# Patient Record
Sex: FEMALE | Race: WHITE | NOT HISPANIC OR LATINO | Employment: UNEMPLOYED | URBAN - NONMETROPOLITAN AREA
[De-identification: names, ages, dates, MRNs, and addresses within clinical notes are randomized per-mention and may not be internally consistent; named-entity substitution may affect disease eponyms.]

---

## 2018-04-12 ENCOUNTER — HISTORICAL (OUTPATIENT)
Dept: ADMINISTRATIVE | Facility: HOSPITAL | Age: 53
End: 2018-04-12

## 2019-02-07 ENCOUNTER — HISTORICAL (OUTPATIENT)
Dept: ADMINISTRATIVE | Facility: HOSPITAL | Age: 54
End: 2019-02-07

## 2019-04-16 ENCOUNTER — HISTORICAL (OUTPATIENT)
Dept: ADMINISTRATIVE | Facility: HOSPITAL | Age: 54
End: 2019-04-16

## 2021-03-16 ENCOUNTER — HISTORICAL (OUTPATIENT)
Dept: ADMINISTRATIVE | Facility: HOSPITAL | Age: 56
End: 2021-03-16

## 2021-06-27 ENCOUNTER — HISTORICAL (OUTPATIENT)
Dept: ADMINISTRATIVE | Facility: HOSPITAL | Age: 56
End: 2021-06-27

## 2021-07-01 ENCOUNTER — HISTORICAL (OUTPATIENT)
Dept: ADMINISTRATIVE | Facility: HOSPITAL | Age: 56
End: 2021-07-01

## 2021-07-02 ENCOUNTER — HISTORICAL (OUTPATIENT)
Dept: ADMINISTRATIVE | Facility: HOSPITAL | Age: 56
End: 2021-07-02

## 2021-07-28 ENCOUNTER — HISTORICAL (OUTPATIENT)
Dept: ADMINISTRATIVE | Facility: HOSPITAL | Age: 56
End: 2021-07-28

## 2022-04-09 ENCOUNTER — HISTORICAL (OUTPATIENT)
Dept: ADMINISTRATIVE | Facility: HOSPITAL | Age: 57
End: 2022-04-09

## 2022-04-22 ENCOUNTER — HISTORICAL (OUTPATIENT)
Dept: ADMINISTRATIVE | Facility: HOSPITAL | Age: 57
End: 2022-04-22

## 2022-04-25 VITALS
SYSTOLIC BLOOD PRESSURE: 120 MMHG | DIASTOLIC BLOOD PRESSURE: 60 MMHG | WEIGHT: 152.75 LBS | BODY MASS INDEX: 27.07 KG/M2 | OXYGEN SATURATION: 94 % | HEIGHT: 63 IN

## 2022-05-02 NOTE — HISTORICAL [facility]
This is a historical note converted from Addie. Formatting and pictures may have been removed.  Please reference Addie for original formatting and attached multimedia. Chief Complaint  Referral for Left RTC Tear  History of Present Illness  53?yo?female?non-smoker?presents to ortho clinic for?initial visit?for?left?shoulder pain.? Patient points to ?lateral?shoulder. Patient dominate hand: right  Today:? New patient referral for abnormal MRI/ shoulder pain left  Onset:??approximately 1?year??progressively worsening  Current pain level: 8/10??without pain medication. Quality described as??sharp?.? Patient?denies?use of pain medication today.?  Medications r/t complaint: Patient reports using?RX / OTC?medications in past including:?meloxicam, OTC ibuprofen/Tylenol?RX per PCP/ prior ortho MD. ?Currently taking OTC Tylenol/ibuprofen?PRN?pain with?mild?relief.?  Modifying Factors: ?Worse with/after activity;Improved with rest;??Stiffness after immobilization??Stiffness improved with less than 30 minutes of activity; ?Increased Pain with upward/ overhead movements and with extended use  Injury:?Denies  Previous treatment: HEP?RX per ortho ortho and reports reports doing?them regularly; RX NSAIDs, PT denies; CSI x2 last injection 8/2018 with good relief only lasting 1-2 days  Associated Symptoms: No Crepitus/Grinding; ?Numbness/ tingling?left hand;??No swelling;?No skin changes;?Weakness of left UE;?Moderate decrease in ROM;??difficulty sleeping at night s/t pain  Activity:?Sedentary, full ADLs;?Pain interferes with function/daily activity (mild)  PMH:? denies CVD; T1?DM reports A1C was normal per PCP;denies RA; denies?gout;? 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  Note:?  Patient was being seen by Dr. Miramontes 2018, told she needed surgery but he did not take her insurance.  Review of Systems  Constitutional:No fever;No chills;No weight loss  CV:No swelling;No edema  GI:No urinary retention;No urinary incontinence  :No fecal incontinence  Skin:No rash;No wound  Neuro:No numbness/tingling;No weakness;No saddle anesthesia  MSK: As above  Psych:No depression;No anxiety  Heme/Lymph:No easy bruising;No easy bleeding;No lymphadenopathy  Immuno:No MRSA history  Physical Exam  Vitals & Measurements  HR:?65(Apical)? BP:?144/84?  HT:?158?cm? WT:?59.1?kg? BMI:?23.67?  MSK:LEFT?SHOULDER  General: No apparent distress;?well-nourished  Inspection:?Guarding/self-imposed immobilization of??left arm;??No skin abnormalities;??Normal?alignment;??No swelling;?No?erythema;?No?bruising;?Atrophy / deconditioning noted?moderate supraspinatus  Palpation:?Tenderness noted at supraspinatus tendon; ?Crepitus:?Negative?  ROM:?  ?????Abduction (180):?90  ?????Adduction (45):?30  ?????Flexion (180):?90  ?????Extension (45):?45  ?????Internal rotation:?sacrum  ?????External rotation (60): 30  Special Tests:  ?????Winging of Scapula: ?Negative?Bilateral  ?????AC Joint Tenderness:?Negative  ?????Apley scratch test:Not tested?  ?????Supraspinatus isometric strength test:?Positive?  ?????Painful Arc Sign:Positive  ?????Jobes empty can test:?Negative?  ?????External rotation test:Positive  ?????Neer test: ?Positive  ?????Pete Teddy test:?Positive?  Neurovascular:?Intact; 2+?distal pulse, sensation intact to light touch  Neuro/Psych: Awake, Alert, Oriented; Normal mood and affect  Lymphatic: No LAD  Skin and Soft Tissue: No bruising, No ecchymosis; No rash; Skin intact  Assessment/Plan  1.?Impingement syndrome of left shoulder?M75.42  ?1.? Discussed with patient diagnosis and treatment recommendations.? Handout given.  2.? Imaging:?Radiological studies ordered, reviewed,?and independently interpreted by me; Discussed  with patient? MRI reviewed and discussed with patient. ?  3.? Treatment plan: Conservative treatment to include? ?oral glucosamine 1500 mg/day; Topical capsaicin as needed; rest;?Hot or cold therapy; Adequate vitamin C/D intake  4.? Procedure:?Discussed CSI vs VS injections as treatment options; since conservative measures did not improve symptoms patient consented for CSI today  5.? Activity:?Activity as tolerated; HEP to included aerobic conditioning with non-painful activity and ROM/STG exercises; activity modification as necessary; eliminate overhead reaching and/or forceful push/pull and use weighted pendulum stretching for 5 minutes after?heat application daily; rubber band provided for HEP  6.? Therapy:Formal PT/OT ordered?  7.? Medication:?First line treatment with? ?daily acetaminophen 1000 mg three times daily; Medication precautions given; RX diclofenac PRN pain; medication?precautions given??  8.? RTC:?3 months?with MRI CD  9.? Additional work-up:?None  Ordered:  Lidocaine inj., 5 mL, form: Injection, Intra-Articular, Once, first dose 02/07/19 12:09:00 CST, stop date 02/07/19 12:09:00 CST  triamcinolone, 40 mg, form: Injection, Intra-Articular, Once, first dose 02/07/19 12:09:00 CST, stop date 02/07/19 12:09:00 CST  asp/inj jnt/bursa, major [id number] , 02/07/19 12:09:00 CST, [facility], Routine, 02/07/19 12:09:00 CST  Clinic Follow up, *Est. 05/07/19 3:00:00 CDT, Order for future visit, Impingement syndrome of left shoulder, [facility]  Office/Outpatient Visit Level 4 Established 268[address] PC, 25, Impingement syndrome of left shoulder, [facility], 02/07/19 12:09:00 CST  ?  Pain?R52  ?same as above  ?  Orders:  diclofenac, 75 mg = 1 tab(s), Oral, BID, PRN PRN as needed for pain, with food; Do NOT take with other NSAIDs, # 60 tab(s), 0 Refill(s), Pharmacy: [facility]   Problem List/Past Medical History  Ongoing  No chronic problems  Historical  No qualifying data  Medications  ADMELOG  SOLO INJ 100U/ML, Subcutaneous, TID  Atorvastatin 10 Mg Tablet, 10 mg= 1 tab(s), qPM  BASAGLAR INJ 100UNIT  diclofenac sodium 75 mg oral delayed release tablet, 75 mg= 1 tab(s), Oral, BID, PRN  FLUTICASONE SPR 50MCG  Kenalog-40 injectable suspension, 40 mg= 1 mL, Intra-Articular, Once  Lidocaine 1% PF 5ml inj, 5 mL, Intra-Articular, Once  LISINOPRIL TAB 2.5MG, 2.5 mg= 1 tab(s), Daily  LORATADINE TAB 10MG, 10 mg= 1 tab(s), Daily  MELOXICAM TAB 15MG, 15 mg= 1 tab(s), Daily,? ?Not taking  METOPROL TAR TAB 50MG, 50 mg= 1 tab(s), BID  Allergies  No Known Medication Allergies  Social History  Alcohol  Past, 02/07/2019  Sexual  Sexual orientation: Straight or heterosexual. Gender Identity Identifies as female., 02/07/2019  Substance Abuse  Never, 02/07/2019  Tobacco  10 or more cigarettes (1/2 pack or more)/day in last 30 days, Cigarettes, No, 20 per day. 29 year(s). Previous treatment: None. Household tobacco concerns: No., 02/07/2019  Health Maintenance  Health Maintenance  ???Pending?(in the next year)  ??? ??OverDue  ??? ? ? ?Diabetes Screening due??and every?  ??? ? ? ?Diabetes Maintenance-HgbA1c due??07/27/17??and every 1??year(s)  ??? ? ? ?Smoking Cessation (Diabetes) due??07/30/18??and every 2??year(s)  ??? ??Due?  ??? ? ? ?ADL Screening due??02/07/19??and every 1??year(s)  ??? ? ? ?Alcohol Misuse Screening due??02/07/19??and every 1??year(s)  ??? ? ? ?Breast Cancer Screening due??02/07/19??and every?  ??? ? ? ?Cervical Cancer Screening due??02/07/19??and every?  ??? ? ? ?Colorectal Screening due??02/07/19??and every?  ??? ? ? ?Diabetes Maintenance-Eye Exam due??02/07/19??and every?  ??? ? ? ?Diabetes Maintenance-Foot Exam due??02/07/19??and every?  ??? ? ? ?Diabetes Maintenance-Fasting Lipid Profile due??02/07/19??and every?  ??? ? ? ?Lipid Screening due??02/07/19??and every?  ??? ? ? ?Tetanus Vaccine due??02/07/19??and every 10??year(s)  ???Satisfied?(in the past 1 year)  ??? ??Satisfied?  ??? ? ? ?Blood Pressure  Screening on??02/07/19.??Satisfied by Nelly Churchill RN  ??? ? ? ?Body Mass Index Check on??02/07/19.??Satisfied by Nelly Churchill RN  ??? ? ? ?Depression Screening on??02/07/19.??Satisfied by Nelly Churchill RN  ??? ? ? ?Obesity Screening on??02/07/19.??Satisfied by Nelly Churchill RN  ?  ?  Diagnostic Results  02/07/2019 10:20 CST XR Shoulder Left Minimum 2 Views  Radiology Report  Left shoulder 4 views  Clinical history is pain  There are no fractures seen. There is no dislocation. There are no  bony lesions noted.  IMPRESSION: No fractures are demonstrated.  ?   MRI dated 4/4/18 per referral from [facility]:  Multiple articular side partial tears with severe tendinopathy of the distal supraspinatus tendon.? Subacromial/subdeltoid bursitis.? Supraspinatus outlet impingement syndrome. ?No definitive labral tear. ?No evidence of abnormal bone marrow signal.     [1]?XR Shoulder Left Minimum 2 Views; Ingrid KIRKLAND, Earle IRWIN 02/07/2019 10:20 CST

## 2022-05-02 NOTE — HISTORICAL [facility] CERNER
This is a historical note converted from Addie. Formatting and pictures may have been removed.  Please reference Addie for original formatting and attached multimedia. Procedure Name  Date/Time:2/7/2019 12:16:38  Procedure:??Left?SASD Injection  Indications:??Diagnostic and Therapeutic Indication - decrease pain, increase range of motion and improve quality of life  RISKS: Possible complications with injection include?bleeding, infection (0.01%), tendon rupture, steroid flare, fat pad or soft tissue atrophy, skin depigmentation, and vasovagal response. ?(Steroid flair treatment is rest, ice, NSAIDs and resolves in 24-36 hours.)  Consent:???Procedure, risks, benefits, & alternatives explained to patient, who voiced understanding and agreed to proceed with procedure. ?Consent signed and?scanned into the medical record. No absolute contraindications (cellulitis overlying joint, infection, lack of informed consent, allergy to injection mediation, kailee protein or egg allergy for sodium hyaluronate, or history of steroid flare) or relative contraindications (brittle or out of control DM HgA1c > 10, coagulopathy INR > 3.5, previous joint replacement, or history of AVN).  Description:?Time out performed. The patient was prepped?using Chlorhexidine scrub after the appropriate?identification of anatomic landmarks.? Sterile needle used (size # 21 gauge, length 1.5 inch)?Topical anesthetic of ethyl chloride was used.? ?5 mL of 1% lidocaine plain with 40 mg of Kenalog injected.  Complications:?None?  EBL:??None  Post Procedure:?Patient reports improvement in pain and ROM. Patient alert, moving all extremities. ?Good peripheral pulses, no signs of vascular compromise, range of motion intact. ?Patient tolerated procedure well. ?Aftercare instructions were given to patient at time of discharge.??Relative rest for 3 days - avoiding excessive activity. ?Pendulum stretching exercises followed by stretching exercises daily?starting  on day 4.? Place ice on area for 15 minutes every 4 to 6 hours.??Tylenol 1000mg twice a day or ibuprofen 600 mg three times per day for next 3-4 days if not on medication already. ?Protect the area for the next 1-8 hours if anesthetic was used. ?Avoid excessive activity for next 3 to 4 weeks.?ER precautions for fever, severe joint pain, or allergic reaction or other new symptoms related to joint injection.

## 2022-05-03 ENCOUNTER — HISTORICAL (OUTPATIENT)
Dept: ADMINISTRATIVE | Facility: HOSPITAL | Age: 57
End: 2022-05-03

## 2022-08-27 ENCOUNTER — HISTORICAL (OUTPATIENT)
Dept: ADMINISTRATIVE | Facility: HOSPITAL | Age: 57
End: 2022-08-27

## 2022-08-27 ENCOUNTER — HOSPITAL ENCOUNTER (INPATIENT)
Facility: HOSPITAL | Age: 57
LOS: 3 days | Discharge: HOME | DRG: 282 | End: 2022-08-30
Attending: INTERNAL MEDICINE | Admitting: INTERNAL MEDICINE
Payer: MEDICAID

## 2022-08-27 DIAGNOSIS — I21.4 NSTEMI (NON-ST ELEVATED MYOCARDIAL INFARCTION): ICD-10-CM

## 2022-08-27 DIAGNOSIS — R07.9 CHEST PAIN: ICD-10-CM

## 2022-08-27 PROBLEM — I20.0 UNSTABLE ANGINA: Status: ACTIVE | Noted: 2022-08-27

## 2022-08-27 PROCEDURE — 36415 COLL VENOUS BLD VENIPUNCTURE: CPT | Performed by: NURSE PRACTITIONER

## 2022-08-27 PROCEDURE — 93005 ELECTROCARDIOGRAM TRACING: CPT

## 2022-08-27 PROCEDURE — 84484 ASSAY OF TROPONIN QUANT: CPT | Performed by: NURSE PRACTITIONER

## 2022-08-27 PROCEDURE — 21400001 HC TELEMETRY ROOM

## 2022-08-27 PROCEDURE — 93010 ELECTROCARDIOGRAM REPORT: CPT | Mod: ,,, | Performed by: INTERNAL MEDICINE

## 2022-08-27 PROCEDURE — 93010 EKG 12-LEAD: ICD-10-PCS | Mod: ,,, | Performed by: INTERNAL MEDICINE

## 2022-08-27 PROCEDURE — 11000001 HC ACUTE MED/SURG PRIVATE ROOM

## 2022-08-27 RX ORDER — SODIUM CHLORIDE 0.9 % (FLUSH) 0.9 %
10 SYRINGE (ML) INJECTION
Status: DISCONTINUED | OUTPATIENT
Start: 2022-08-28 | End: 2022-08-30 | Stop reason: HOSPADM

## 2022-08-27 NOTE — Clinical Note
The catheter was inserted into the, was removed from the and was inserted over the wire into the ostial  left coronary artery. Hemodynamics were performed.  An angiography was performed of the left coronary arteries. Multiple views were taken. The angiography was performed via power injection.

## 2022-08-27 NOTE — Clinical Note
The catheter was inserted into the, was removed from the and was inserted over the wire into the aorta. Hemodynamics were performed.  An angiography was performed of the aorta and right coronary arteries. Multiple views were taken. The angiography was performed via power injection.  Aortic root angio performed, RCA visualized non selectively.

## 2022-08-27 NOTE — Clinical Note
The catheter was inserted into the, was removed from the and was inserted over the wire into the ostium   right coronary artery. The catheter was unable to engage the area..

## 2022-08-27 NOTE — Clinical Note
The left ventricle was selectively engaged. Catheter inserted and removed after LV pressures measured.

## 2022-08-27 NOTE — Clinical Note
The catheter was inserted into the, was removed from the and was inserted over the wire into the ostium   right coronary artery. An angiography was performed of the right coronary arteries. The catheter was unable to engage the area..

## 2022-08-27 NOTE — Clinical Note
The radial band was applied to the right radial artery. 13 cc's of air were inserted into the closure device.

## 2022-08-27 NOTE — Clinical Note
The groin and right radial was prepped. The site was prepped with ChloraPrep. The site was clipped. The patient was draped. The patient was positioned supine.

## 2022-08-27 NOTE — Clinical Note
The catheter was inserted into the, was removed from the and was inserted over the wire into the ostium   right coronary artery.

## 2022-08-28 LAB
ABS NEUT (OLG): 7.54 X10(3)/MCL (ref 2.1–9.2)
APTT PPP: 24 SECONDS (ref 23.2–33.7)
APTT PPP: 25.2 SECONDS (ref 23.2–33.7)
AV INDEX (PROSTH): 0.91
AV MEAN GRADIENT: 3 MMHG
AV PEAK GRADIENT: 6 MMHG
AV VALVE AREA: 2.58 CM2
AV VELOCITY RATIO: 0.97
BASOPHILS # BLD AUTO: 0.08 X10(3)/MCL (ref 0–0.2)
BASOPHILS NFR BLD AUTO: 0.7 %
BSA FOR ECHO PROCEDURE: 1.67 M2
CV ECHO LV RWT: 0.39 CM
DOP CALC AO PEAK VEL: 1.23 M/S
DOP CALC AO VTI: 25.2 CM
DOP CALC LVOT AREA: 2.8 CM2
DOP CALC LVOT DIAMETER: 1.9 CM
DOP CALC LVOT PEAK VEL: 1.19 M/S
DOP CALC LVOT STROKE VOLUME: 64.9 CM3
DOP CALC MV VTI: 22.1 CM
DOP CALCLVOT PEAK VEL VTI: 22.9 CM
E WAVE DECELERATION TIME: 217 MSEC
E/A RATIO: 0.96
E/E' RATIO: 8.67 M/S
ECHO LV POSTERIOR WALL: 0.87 CM (ref 0.6–1.1)
EJECTION FRACTION: 64 %
EOSINOPHIL # BLD AUTO: 0.07 X10(3)/MCL (ref 0–0.9)
EOSINOPHIL NFR BLD AUTO: 0.6 %
EOSINOPHIL NFR BLD MANUAL: 0.13 X10(3)/MCL (ref 0–0.9)
EOSINOPHIL NFR BLD MANUAL: 1 %
ERYTHROCYTE [DISTWIDTH] IN BLOOD BY AUTOMATED COUNT: 13.8 % (ref 11.5–17)
ERYTHROCYTE [DISTWIDTH] IN BLOOD BY AUTOMATED COUNT: 14.1 % (ref 11.5–17)
FRACTIONAL SHORTENING: 38 % (ref 28–44)
HCT VFR BLD AUTO: 44.3 % (ref 37–47)
HCT VFR BLD AUTO: 49.8 % (ref 37–47)
HGB BLD-MCNC: 14.7 GM/DL (ref 12–16)
HGB BLD-MCNC: 15.5 GM/DL (ref 12–16)
IMM GRANULOCYTES # BLD AUTO: 0.03 X10(3)/MCL (ref 0–0.04)
IMM GRANULOCYTES # BLD AUTO: 0.03 X10(3)/MCL (ref 0–0.04)
IMM GRANULOCYTES NFR BLD AUTO: 0.2 %
IMM GRANULOCYTES NFR BLD AUTO: 0.3 %
INR BLD: 0.93 (ref 0–1.3)
INSTRUMENT WBC (OLG): 13 X10(3)/MCL
INTERVENTRICULAR SEPTUM: 0.84 CM (ref 0.6–1.1)
LEFT ATRIUM SIZE: 2.6 CM
LEFT ATRIUM VOLUME INDEX MOD: 12.2 ML/M2
LEFT ATRIUM VOLUME MOD: 20 CM3
LEFT INTERNAL DIMENSION IN SYSTOLE: 2.77 CM (ref 2.1–4)
LEFT VENTRICLE DIASTOLIC VOLUME INDEX: 55.49 ML/M2
LEFT VENTRICLE DIASTOLIC VOLUME: 91 ML
LEFT VENTRICLE MASS INDEX: 75 G/M2
LEFT VENTRICLE SYSTOLIC VOLUME INDEX: 17.6 ML/M2
LEFT VENTRICLE SYSTOLIC VOLUME: 28.8 ML
LEFT VENTRICULAR INTERNAL DIMENSION IN DIASTOLE: 4.47 CM (ref 3.5–6)
LEFT VENTRICULAR MASS: 122.67 G
LV LATERAL E/E' RATIO: 8.67 M/S
LV SEPTAL E/E' RATIO: 8.67 M/S
LVOT MG: 3 MMHG
LVOT MV: 0.78 CM/S
LYMPHOCYTES # BLD AUTO: 4 X10(3)/MCL (ref 0.6–4.6)
LYMPHOCYTES NFR BLD AUTO: 35.2 %
LYMPHOCYTES NFR BLD MANUAL: 37 %
LYMPHOCYTES NFR BLD MANUAL: 4.81 X10(3)/MCL
MCH RBC QN AUTO: 29.5 PG (ref 27–31)
MCH RBC QN AUTO: 29.6 PG (ref 27–31)
MCHC RBC AUTO-ENTMCNC: 31.1 MG/DL (ref 33–36)
MCHC RBC AUTO-ENTMCNC: 33.2 MG/DL (ref 33–36)
MCV RBC AUTO: 89.1 FL (ref 80–94)
MCV RBC AUTO: 94.9 FL (ref 80–94)
MONOCYTES # BLD AUTO: 0.89 X10(3)/MCL (ref 0.1–1.3)
MONOCYTES NFR BLD AUTO: 7.8 %
MONOCYTES NFR BLD MANUAL: 0.65 X10(3)/MCL (ref 0.1–1.3)
MONOCYTES NFR BLD MANUAL: 5 %
MV MEAN GRADIENT: 2 MMHG
MV PEAK A VEL: 0.81 M/S
MV PEAK E VEL: 0.78 M/S
MV PEAK GRADIENT: 4 MMHG
MV VALVE AREA BY CONTINUITY EQUATION: 2.94 CM2
NEUTROPHILS # BLD AUTO: 6.3 X10(3)/MCL (ref 2.1–9.2)
NEUTROPHILS NFR BLD AUTO: 55.4 %
NEUTROPHILS NFR BLD MANUAL: 58 %
NRBC BLD AUTO-RTO: 0 %
NRBC BLD AUTO-RTO: 0 %
PLATELET # BLD AUTO: 218 X10(3)/MCL (ref 130–400)
PLATELET # BLD AUTO: 227 X10(3)/MCL (ref 130–400)
PLATELET # BLD EST: ADEQUATE 10*3/UL
PMV BLD AUTO: 11.8 FL (ref 7.4–10.4)
PMV BLD AUTO: 11.9 FL (ref 7.4–10.4)
POCT GLUCOSE: 116 MG/DL (ref 70–110)
POCT GLUCOSE: 473 MG/DL (ref 70–110)
POCT GLUCOSE: 492 MG/DL (ref 70–110)
PROTHROMBIN TIME: 12.3 SECONDS (ref 12.5–14.5)
RA PRESSURE: 3 MMHG
RBC # BLD AUTO: 4.97 X10(6)/MCL (ref 4.2–5.4)
RBC # BLD AUTO: 5.25 X10(6)/MCL (ref 4.2–5.4)
RBC MORPH BLD: NORMAL
RIGHT VENTRICULAR END-DIASTOLIC DIMENSION: 2.28 CM
TDI LATERAL: 0.09 M/S
TDI SEPTAL: 0.09 M/S
TDI: 0.09 M/S
TRICUSPID ANNULAR PLANE SYSTOLIC EXCURSION: 1.65 CM
TROPONIN I SERPL-MCNC: 0.12 NG/ML (ref 0–0.04)
TROPONIN I SERPL-MCNC: 0.24 NG/ML (ref 0–0.04)
TROPONIN I SERPL-MCNC: 0.4 NG/ML (ref 0–0.04)
WBC # SPEC AUTO: 11.4 X10(3)/MCL (ref 4.5–11.5)
WBC # SPEC AUTO: 12.5 X10(3)/MCL (ref 4.5–11.5)

## 2022-08-28 PROCEDURE — 85730 THROMBOPLASTIN TIME PARTIAL: CPT | Performed by: NURSE PRACTITIONER

## 2022-08-28 PROCEDURE — 85730 THROMBOPLASTIN TIME PARTIAL: CPT | Performed by: INTERNAL MEDICINE

## 2022-08-28 PROCEDURE — 84484 ASSAY OF TROPONIN QUANT: CPT | Performed by: NURSE PRACTITIONER

## 2022-08-28 PROCEDURE — 25000003 PHARM REV CODE 250: Performed by: NURSE PRACTITIONER

## 2022-08-28 PROCEDURE — 85610 PROTHROMBIN TIME: CPT | Performed by: NURSE PRACTITIONER

## 2022-08-28 PROCEDURE — 85025 COMPLETE CBC W/AUTO DIFF WBC: CPT | Performed by: NURSE PRACTITIONER

## 2022-08-28 PROCEDURE — 21400001 HC TELEMETRY ROOM

## 2022-08-28 PROCEDURE — 63600175 PHARM REV CODE 636 W HCPCS: Performed by: NURSE PRACTITIONER

## 2022-08-28 PROCEDURE — 36415 COLL VENOUS BLD VENIPUNCTURE: CPT | Performed by: NURSE PRACTITIONER

## 2022-08-28 RX ORDER — IBUPROFEN 200 MG
24 TABLET ORAL
Status: DISCONTINUED | OUTPATIENT
Start: 2022-08-28 | End: 2022-08-30 | Stop reason: HOSPADM

## 2022-08-28 RX ORDER — SODIUM CHLORIDE 0.9 % (FLUSH) 0.9 %
10 SYRINGE (ML) INJECTION
Status: DISCONTINUED | OUTPATIENT
Start: 2022-08-28 | End: 2022-08-30 | Stop reason: HOSPADM

## 2022-08-28 RX ORDER — ASPIRIN 81 MG/1
81 TABLET ORAL DAILY
Status: DISCONTINUED | OUTPATIENT
Start: 2022-08-28 | End: 2022-08-30 | Stop reason: HOSPADM

## 2022-08-28 RX ORDER — METOPROLOL TARTRATE 25 MG/1
12.5 TABLET ORAL 2 TIMES DAILY
Status: DISCONTINUED | OUTPATIENT
Start: 2022-08-28 | End: 2022-08-30 | Stop reason: HOSPADM

## 2022-08-28 RX ORDER — GLUCAGON 1 MG
1 KIT INJECTION
Status: DISCONTINUED | OUTPATIENT
Start: 2022-08-28 | End: 2022-08-30 | Stop reason: HOSPADM

## 2022-08-28 RX ORDER — IBUPROFEN 200 MG
16 TABLET ORAL
Status: DISCONTINUED | OUTPATIENT
Start: 2022-08-28 | End: 2022-08-30 | Stop reason: HOSPADM

## 2022-08-28 RX ORDER — INSULIN ASPART 100 [IU]/ML
1-10 INJECTION, SOLUTION INTRAVENOUS; SUBCUTANEOUS
Status: DISCONTINUED | OUTPATIENT
Start: 2022-08-28 | End: 2022-08-30 | Stop reason: HOSPADM

## 2022-08-28 RX ORDER — HEPARIN SODIUM,PORCINE/D5W 25000/250
0-40 INTRAVENOUS SOLUTION INTRAVENOUS CONTINUOUS
Status: DISCONTINUED | OUTPATIENT
Start: 2022-08-28 | End: 2022-08-30 | Stop reason: HOSPADM

## 2022-08-28 RX ORDER — ATORVASTATIN CALCIUM 40 MG/1
40 TABLET, FILM COATED ORAL NIGHTLY
Status: DISCONTINUED | OUTPATIENT
Start: 2022-08-28 | End: 2022-08-30 | Stop reason: HOSPADM

## 2022-08-28 RX ADMIN — METOPROLOL TARTRATE 12.5 MG: 25 TABLET, FILM COATED ORAL at 09:08

## 2022-08-28 RX ADMIN — INSULIN ASPART 10 UNITS: 100 INJECTION, SOLUTION INTRAVENOUS; SUBCUTANEOUS at 10:08

## 2022-08-28 RX ADMIN — ATORVASTATIN CALCIUM 40 MG: 40 TABLET, FILM COATED ORAL at 09:08

## 2022-08-28 RX ADMIN — TICAGRELOR 90 MG: 90 TABLET ORAL at 09:08

## 2022-08-28 RX ADMIN — ATORVASTATIN CALCIUM 40 MG: 40 TABLET, FILM COATED ORAL at 01:08

## 2022-08-28 RX ADMIN — METOPROLOL TARTRATE 12.5 MG: 25 TABLET, FILM COATED ORAL at 01:08

## 2022-08-28 RX ADMIN — TICAGRELOR 180 MG: 90 TABLET ORAL at 01:08

## 2022-08-28 RX ADMIN — ASPIRIN 81 MG: 81 TABLET, COATED ORAL at 09:08

## 2022-08-28 RX ADMIN — HEPARIN SODIUM 12 UNITS/KG/HR: 10000 INJECTION, SOLUTION INTRAVENOUS at 01:08

## 2022-08-28 NOTE — H&P
[facility] - 6th Floor Medical Telemetry  Cardiology  History and Physical     Patient Name: Danitza Lerner  MRN: [id number]  Admission Date: 8/27/2022  Code Status: Full Code   Attending Provider: Rachna Bustos MD   Primary Care Physician: Primary Doctor No  Principal Problem:<principal problem not specified>    Patient information was obtained from patient, past medical records, and ER records.     Subjective:     Chief Complaint:  Chest pain     HPI:   This is a 57-year-old female, who is unknown to [facility], with history of HTN, dm type 2, HLD, osteoarthritis, CAD/stent.  She presents to the ER at outlying facility with complaints of mid substernal chest pain, dizziness, weakness nausea, right shoulder pain radiating to her back, increased thirst, and a nose bleed had subsided prior to arrival.  She stated that her arms felt heavy for the past few days.  She stated that she felt weird, weak and sweaty during the episode.  She stated the pain started the day of arrival and she rated 8/10.  Her troponin there was elevated at 0.568.  EKG revealed normal sinus rhythm with a nonspecific T-wave abnormality.  She was transferred to [facility] for higher level of care.  [facility] has admitted the patient for NSTEMI.    PMH: HTN, dm type 2, HLD, osteoarthritis, CAD/stent  PSH: LHC/stent, Caesarean section, tubal ligation  Social History:  Denies EtOH, tobacco, and illicit drug use  Family History:  Noncontributory    Previous Cardiac Diagnostics:   No previous diagnosis available for review      Review of patient's allergies indicates:  Not on File    No current facility-administered medications on file prior to encounter.     No current outpatient medications on file prior to encounter.         Review of Systems   Constitutional: Positive for diaphoresis and malaise/fatigue.   HENT:  Positive for nosebleeds.    Eyes: Negative.    Cardiovascular:  Positive for chest pain.   Respiratory: Negative.     Endocrine:  Negative.    Hematologic/Lymphatic: Negative.    Skin: Negative.    Musculoskeletal: Negative.    Gastrointestinal: Negative.    Genitourinary: Negative.    Neurological:  Positive for dizziness and weakness.   Psychiatric/Behavioral: Negative.     Allergic/Immunologic: Negative.    Objective:     Vital Signs (Most Recent):  Temp: 98.4 °F (36.9 °C) (08/28/22 0723)  Pulse: 66 (08/28/22 0723)  Resp: 18 (08/28/22 0723)  BP: 125/74 (08/28/22 0723)  SpO2: 98 % (08/28/22 0723) Vital Signs (24h Range):  Temp:  [97.8 °F (36.6 °C)-98.4 °F (36.9 °C)] 98.4 °F (36.9 °C)  Pulse:  [65-81] 66  Resp:  [18] 18  SpO2:  [97 %-99 %] 98 %  BP: (119-157)/(74-84) 125/74     Weight: 64.3 kg (141 lb 12.1 oz)  Body mass index is 25.93 kg/m².    SpO2: 98 %       No intake or output data in the 24 hours ending 08/28/22 0726    Lines/Drains/Airways       Peripheral Intravenous Line  Duration                  Peripheral IV - Single Lumen Left Antecubital -- days                    Physical Exam  Constitutional:       Appearance: Normal appearance.   HENT:      Head: Normocephalic.   Eyes:      Extraocular Movements: Extraocular movements intact.      Conjunctiva/sclera: Conjunctivae normal.   Cardiovascular:      Rate and Rhythm: Normal rate and regular rhythm.      Pulses: Normal pulses.      Heart sounds: Normal heart sounds.   Pulmonary:      Effort: Pulmonary effort is normal.      Breath sounds: Normal breath sounds.   Abdominal:      General: Abdomen is flat.   Musculoskeletal:         General: Normal range of motion.      Cervical back: Normal range of motion and neck supple.   Skin:     General: Skin is warm and dry.   Neurological:      Mental Status: She is alert and oriented to person, place, and time.       Significant Labs: CMP No results for input(s): NA, K, CL, CO2, GLU, BUN, CREATININE, CALCIUM, PROT, ALBUMIN, BILITOT, ALKPHOS, AST, ALT, ANIONGAP, ESTGFRAFRICA, EGFRNONAA in the last 48 hours., CBC   Recent Labs   Lab  08/28/22  0043 08/28/22  0542   WBC 12.5* 11.4   HGB 14.7 15.5   HCT 44.3 49.8*    218   , and Troponin   Recent Labs   Lab 08/27/22  2342 08/28/22  0542   TROPONINI 0.403* 0.240*       Significant Imaging:   EKG:      Assessment and Plan:     Impression:  NSTEMI   -Troponin max 0.568, trended down  CAD/stent to RCA (12.2021)  HTN  HLD  DM II  Osteoarthritis    Plan:  Continue heparin gtt   Continue ASA and Brillinta  Ok for clear liquid breakfast at 0600, NPO for medications after that.  Plan for LHC in AM  R/B/A discussed with patient and she wishes to proceed with LHC.      BERTO LeiCNP-BC  Cardiology  [facility] - 6th Floor Medical Telemetry  08/28/2022 7:26 AM

## 2022-08-29 LAB
ABS NEUT (OLG): 6.6 X10(3)/MCL (ref 2.1–9.2)
ALBUMIN SERPL-MCNC: 3.7 GM/DL (ref 3.5–5)
ALBUMIN/GLOB SERPL: 1.2 RATIO (ref 1.1–2)
ALP SERPL-CCNC: 114 UNIT/L (ref 40–150)
ALT SERPL-CCNC: 13 UNIT/L (ref 0–55)
APTT PPP: 44.7 SECONDS (ref 23.2–33.7)
APTT PPP: 75.3 SECONDS (ref 23.2–33.7)
AST SERPL-CCNC: 14 UNIT/L (ref 5–34)
BASOPHILS NFR BLD MANUAL: 0.11 X10(3)/MCL (ref 0–0.2)
BASOPHILS NFR BLD MANUAL: 1 %
BILIRUBIN DIRECT+TOT PNL SERPL-MCNC: 0.6 MG/DL
BUN SERPL-MCNC: 17.1 MG/DL (ref 9.8–20.1)
BURR CELLS (OLG): ABNORMAL
CALCIUM SERPL-MCNC: 9.6 MG/DL (ref 8.4–10.2)
CHLORIDE SERPL-SCNC: 104 MMOL/L (ref 98–107)
CO2 SERPL-SCNC: 20 MMOL/L (ref 22–29)
CREAT SERPL-MCNC: 0.78 MG/DL (ref 0.55–1.02)
ERYTHROCYTE [DISTWIDTH] IN BLOOD BY AUTOMATED COUNT: 14.1 % (ref 11.5–17)
GFR SERPLBLD CREATININE-BSD FMLA CKD-EPI: >60 MLS/MIN/1.73/M2
GLOBULIN SER-MCNC: 3.1 GM/DL (ref 2.4–3.5)
GLUCOSE SERPL-MCNC: 229 MG/DL (ref 74–100)
HCT VFR BLD AUTO: 46.2 % (ref 37–47)
HGB BLD-MCNC: 15 GM/DL (ref 12–16)
IMM GRANULOCYTES # BLD AUTO: 0.03 X10(3)/MCL (ref 0–0.04)
IMM GRANULOCYTES NFR BLD AUTO: 0.3 %
INSTRUMENT WBC (OLG): 11 X10(3)/MCL
LYMPHOCYTES NFR BLD MANUAL: 3.41 X10(3)/MCL
LYMPHOCYTES NFR BLD MANUAL: 31 %
MCH RBC QN AUTO: 29.8 PG (ref 27–31)
MCHC RBC AUTO-ENTMCNC: 32.5 MG/DL (ref 33–36)
MCV RBC AUTO: 91.8 FL (ref 80–94)
MONOCYTES NFR BLD MANUAL: 0.44 X10(3)/MCL (ref 0.1–1.3)
MONOCYTES NFR BLD MANUAL: 4 %
NEUTROPHILS NFR BLD MANUAL: 60 %
NRBC BLD AUTO-RTO: 0 %
PLATELET # BLD AUTO: 222 X10(3)/MCL (ref 130–400)
PLATELET # BLD EST: NORMAL 10*3/UL
PMV BLD AUTO: 12 FL (ref 7.4–10.4)
POCT GLUCOSE: 216 MG/DL (ref 70–110)
POCT GLUCOSE: >500 MG/DL (ref 70–110)
POIKILOCYTOSIS BLD QL SMEAR: ABNORMAL
POTASSIUM SERPL-SCNC: 4.3 MMOL/L (ref 3.5–5.1)
PROT SERPL-MCNC: 6.8 GM/DL (ref 6.4–8.3)
RBC # BLD AUTO: 5.03 X10(6)/MCL (ref 4.2–5.4)
RBC MORPH BLD: ABNORMAL
SODIUM SERPL-SCNC: 135 MMOL/L (ref 136–145)
WBC # SPEC AUTO: 11 X10(3)/MCL (ref 4.5–11.5)

## 2022-08-29 PROCEDURE — 93458 L HRT ARTERY/VENTRICLE ANGIO: CPT | Performed by: INTERNAL MEDICINE

## 2022-08-29 PROCEDURE — 85730 THROMBOPLASTIN TIME PARTIAL: CPT | Performed by: INTERNAL MEDICINE

## 2022-08-29 PROCEDURE — 63600175 PHARM REV CODE 636 W HCPCS: Performed by: NURSE PRACTITIONER

## 2022-08-29 PROCEDURE — 25000003 PHARM REV CODE 250: Performed by: NURSE PRACTITIONER

## 2022-08-29 PROCEDURE — 25500020 PHARM REV CODE 255: Performed by: INTERNAL MEDICINE

## 2022-08-29 PROCEDURE — 99152 MOD SED SAME PHYS/QHP 5/>YRS: CPT | Performed by: INTERNAL MEDICINE

## 2022-08-29 PROCEDURE — 99153 MOD SED SAME PHYS/QHP EA: CPT | Performed by: INTERNAL MEDICINE

## 2022-08-29 PROCEDURE — 63600175 PHARM REV CODE 636 W HCPCS: Performed by: INTERNAL MEDICINE

## 2022-08-29 PROCEDURE — 36415 COLL VENOUS BLD VENIPUNCTURE: CPT | Performed by: INTERNAL MEDICINE

## 2022-08-29 PROCEDURE — 80053 COMPREHEN METABOLIC PANEL: CPT | Performed by: NURSE PRACTITIONER

## 2022-08-29 PROCEDURE — 36415 COLL VENOUS BLD VENIPUNCTURE: CPT | Performed by: NURSE PRACTITIONER

## 2022-08-29 PROCEDURE — C1894 INTRO/SHEATH, NON-LASER: HCPCS | Performed by: INTERNAL MEDICINE

## 2022-08-29 PROCEDURE — 21400001 HC TELEMETRY ROOM

## 2022-08-29 PROCEDURE — 25000003 PHARM REV CODE 250: Performed by: INTERNAL MEDICINE

## 2022-08-29 PROCEDURE — C1769 GUIDE WIRE: HCPCS | Performed by: INTERNAL MEDICINE

## 2022-08-29 PROCEDURE — 27201423 OPTIME MED/SURG SUP & DEVICES STERILE SUPPLY: Performed by: INTERNAL MEDICINE

## 2022-08-29 PROCEDURE — 85025 COMPLETE CBC W/AUTO DIFF WBC: CPT | Performed by: NURSE PRACTITIONER

## 2022-08-29 PROCEDURE — C1887 CATHETER, GUIDING: HCPCS | Performed by: INTERNAL MEDICINE

## 2022-08-29 RX ORDER — FENTANYL CITRATE 50 UG/ML
INJECTION, SOLUTION INTRAMUSCULAR; INTRAVENOUS
Status: DISCONTINUED | OUTPATIENT
Start: 2022-08-29 | End: 2022-08-30 | Stop reason: HOSPADM

## 2022-08-29 RX ORDER — SODIUM CHLORIDE 9 MG/ML
INJECTION, SOLUTION INTRAVENOUS CONTINUOUS
Status: ACTIVE | OUTPATIENT
Start: 2022-08-29 | End: 2022-08-29

## 2022-08-29 RX ORDER — LIDOCAINE HYDROCHLORIDE 10 MG/ML
INJECTION INFILTRATION; PERINEURAL
Status: DISCONTINUED | OUTPATIENT
Start: 2022-08-29 | End: 2022-08-30 | Stop reason: HOSPADM

## 2022-08-29 RX ORDER — HEPARIN SODIUM 1000 [USP'U]/ML
INJECTION, SOLUTION INTRAVENOUS; SUBCUTANEOUS
Status: DISCONTINUED | OUTPATIENT
Start: 2022-08-29 | End: 2022-08-30 | Stop reason: HOSPADM

## 2022-08-29 RX ORDER — MIDAZOLAM HYDROCHLORIDE 1 MG/ML
INJECTION INTRAMUSCULAR; INTRAVENOUS
Status: DISCONTINUED | OUTPATIENT
Start: 2022-08-29 | End: 2022-08-30 | Stop reason: HOSPADM

## 2022-08-29 RX ADMIN — TICAGRELOR 90 MG: 90 TABLET ORAL at 08:08

## 2022-08-29 RX ADMIN — INSULIN ASPART 10 UNITS: 100 INJECTION, SOLUTION INTRAVENOUS; SUBCUTANEOUS at 08:08

## 2022-08-29 RX ADMIN — ATORVASTATIN CALCIUM 40 MG: 40 TABLET, FILM COATED ORAL at 08:08

## 2022-08-29 RX ADMIN — SODIUM CHLORIDE: 9 INJECTION, SOLUTION INTRAVENOUS at 11:08

## 2022-08-29 RX ADMIN — METOPROLOL TARTRATE 12.5 MG: 25 TABLET, FILM COATED ORAL at 08:08

## 2022-08-29 RX ADMIN — ASPIRIN 81 MG: 81 TABLET, COATED ORAL at 08:08

## 2022-08-29 RX ADMIN — INSULIN ASPART 10 UNITS: 100 INJECTION, SOLUTION INTRAVENOUS; SUBCUTANEOUS at 05:08

## 2022-08-29 NOTE — PROGRESS NOTES
[facility] - 6th Floor Medical Telemetry  Cardiology  Progress Note    Patient Name: Danitza Lerner  MRN: [id number]  Admission Date: 8/27/2022  Hospital Length of Stay: 2 days  Code Status: Full Code   Attending Physician: Rachna Bustos MD   Primary Care Physician: Primary Doctor No  Expected Discharge Date:   Principal Problem:<principal problem not specified>    Subjective:     Brief HPI: This is a 57-year-old female, who is unknown to [facility], with history of HTN, dm type 2, HLD, osteoarthritis, CAD/stent.  She presents to the ER at outlying facility with complaints of mid substernal chest pain, dizziness, weakness nausea, right shoulder pain radiating to her back, increased thirst, and a nose bleed had subsided prior to arrival.  She stated that her arms felt heavy for the past few days.  She stated that she felt weird, weak and sweaty during the episode.  She stated the pain started the day of arrival and she rated 8/10.  Her troponin there was elevated at 0.568.  EKG revealed normal sinus rhythm with a nonspecific T-wave abnormality.  She was transferred to [facility] for higher level of care.  [facility] has admitted the patient for NSTEMI.    Hospital Course:  8.29.22: NAD noted. VSS. SR on tele. NPO for [facility] today. Denies CP/SOB/palps     PMH: HTN, dm type 2, HLD, osteoarthritis, CAD/stent  PSH: LHC/stent, Caesarean section, tubal ligation  Social History:  Denies EtOH, tobacco, and illicit drug use  Family History:  Noncontributory    Previous Cardiac Diagnostics:   The left ventricle is normal in size with normal systolic function.  Normal left ventricular diastolic function.  The estimated ejection fraction is 64%.  No regional wall motion abnormalities noted in this study.  Normal right ventricular size with normal right ventricular systolic function.  Mild mitral regurgitation.  Mild tricuspid regurgitation.  Normal central venous pressure (3 mmHg).    Review of Systems   Constitutional: Negative  for chills and fever.   Cardiovascular:  Negative for chest pain and palpitations.   Respiratory:  Negative for shortness of breath.    Psychiatric/Behavioral:  Negative for altered mental status.          Objective:     Vital Signs (Most Recent):  Temp: 97.7 °F (36.5 °C) (08/29/22 0810)  Pulse: 74 (08/29/22 0810)  Resp: 16 (08/29/22 0810)  BP: 120/72 (08/29/22 0810)  SpO2: 98 % (08/29/22 0810) Vital Signs (24h Range):  Temp:  [97.7 °F (36.5 °C)-98.4 °F (36.9 °C)] 97.7 °F (36.5 °C)  Pulse:  [58-89] 74  Resp:  [16-18] 16  SpO2:  [95 %-99 %] 98 %  BP: (120-147)/(72-79) 120/72     Weight: 63.7 kg (140 lb 6.9 oz)  Body mass index is 25.84 kg/m².    SpO2: 98 %  O2 Device (Oxygen Therapy): room air      Intake/Output Summary (Last 24 hours) at 8/29/2022 0843  Last data filed at 8/29/2022 0600  Gross per 24 hour   Intake 960 ml   Output 1000 ml   Net -40 ml       Lines/Drains/Airways       Peripheral Intravenous Line  Duration                  Peripheral IV - Single Lumen 08/27/22 2000 22 G Left Antecubital 1 day                    Significant Labs: CMP   Recent Labs   Lab 08/29/22  0441   *   K 4.3   CO2 20*   BUN 17.1   CREATININE 0.78   CALCIUM 9.6   ALBUMIN 3.7   BILITOT 0.6   ALKPHOS 114   AST 14   ALT 13   , CBC   Recent Labs   Lab 08/28/22  0043 08/28/22  0542 08/29/22  0441   WBC 12.5* 11.4 11.0   HGB 14.7 15.5 15.0   HCT 44.3 49.8* 46.2    218 222   , and Troponin   Recent Labs   Lab 08/27/22  2342 08/28/22  0542 08/28/22  1201   TROPONINI 0.403* 0.240* 0.124*       Telemetry:  SR    Physical Exam:  Physical Exam  Constitutional:       Appearance: Normal appearance.   HENT:      Head: Atraumatic.   Eyes:      Extraocular Movements: Extraocular movements intact.      Conjunctiva/sclera: Conjunctivae normal.   Cardiovascular:      Rate and Rhythm: Normal rate and regular rhythm.      Pulses: Normal pulses.      Heart sounds: Normal heart sounds.   Pulmonary:      Effort: Pulmonary effort is normal.       Breath sounds: Normal breath sounds.   Abdominal:      Palpations: Abdomen is soft.   Musculoskeletal:         General: Normal range of motion.      Cervical back: Neck supple.   Skin:     General: Skin is warm and dry.   Neurological:      Mental Status: She is alert and oriented to person, place, and time.   Psychiatric:         Mood and Affect: Mood normal.         Behavior: Behavior normal.       Current Inpatient Medications:    Current Facility-Administered Medications:     aspirin EC tablet 81 mg, 81 mg, Oral, Daily, Ilor-Ashok W Reyes, NP, 81 mg at 08/29/22 0816    atorvastatin tablet 40 mg, 40 mg, Oral, QHS, Lior-Ashok W Reyes, NP, 40 mg at 08/28/22 2146    dextrose 10% bolus 125 mL, 12.5 g, Intravenous, PRN, Lior-Ashok W Reyes, NP    dextrose 10% bolus 125 mL, 12.5 g, Intravenous, PRN, Lior-Ashok W Reyes, NP    dextrose 10% bolus 250 mL, 25 g, Intravenous, PRN, Lior-Ashok W Reyes, NP    dextrose 10% bolus 250 mL, 25 g, Intravenous, PRN, Lior-Ashok W Reyes, NP    glucagon (human recombinant) injection 1 mg, 1 mg, Intramuscular, PRN, Lior-Ashok W Reyes, NP    glucagon (human recombinant) injection 1 mg, 1 mg, Intramuscular, PRN, Lior-Ashok W Reyes, NP    glucose chewable tablet 16 g, 16 g, Oral, PRN, Lior-Ashok W Reyes, NP    glucose chewable tablet 16 g, 16 g, Oral, PRN, Lior-Ashok W Reyes, NP    glucose chewable tablet 24 g, 24 g, Oral, PRN, Lior-Ashok W Reyes, NP    glucose chewable tablet 24 g, 24 g, Oral, PRN, Lior-Ashok W Reyes, NP    heparin 25,000 units in dextrose 5% (100 units/ml) IV bolus from bag - ADDITIONAL PRN BOLUS - 30 units/kg (max bolus 4000 units), 30 Units/kg (Adjusted), Intravenous, PRN, Lior-Ashok W Reyes, NP    heparin 25,000 units in dextrose 5% (100 units/ml) IV bolus from bag - ADDITIONAL PRN BOLUS - 60 units/kg (max bolus 4000 units), 60 Units/kg (Adjusted), Intravenous, PRN, Joe Reyes, NP    heparin  25,000 units in dextrose 5% (100 units/ml) IV bolus from bag INITIAL BOLUS (max bolus 4000 units), 60 Units/kg (Adjusted), Intravenous, Once, Joe Reyes NP    heparin 25,000 units in dextrose 5% 250 mL (100 units/mL) infusion LOW INTENSITY nomogram - LAF, 0-40 Units/kg/hr (Adjusted), Intravenous, Continuous, Joe Reyes NP, Last Rate: 10 mL/hr at 08/29/22 0134, 18 Units/kg/hr at 08/29/22 0134    insulin aspart U-100 injection 1-10 Units, 1-10 Units, Subcutaneous, QID (AC + HS) PRN, Joe Reyes NP, 10 Units at 08/28/22 2220    metoprolol tartrate (LOPRESSOR) split tablet 12.5 mg, 12.5 mg, Oral, BID, Joe Reyes NP, 12.5 mg at 08/29/22 0816    sodium chloride 0.9% flush 10 mL, 10 mL, Intravenous, PRN, Joe Reyes NP    sodium chloride 0.9% flush 10 mL, 10 mL, Intravenous, PRN, AMADOR Lei    ticagrelor tablet 90 mg, 90 mg, Oral, BID, AMADOR Lei, 90 mg at 08/29/22 0816        Assessment:     IMPRESSION:  NSTEMI                   -Troponin max 0.568, trended down  CAD/stent to RCA (12.2021)  HTN  HLD  DM II  Osteoarthritis  No documented Hx of GI Bleed         Plan:     PLAN:  Continue heparin gtt   Continue ASA and Brillinta  Keep NPO.  Plan for LHC  R/B/A discussed with patient and she wishes to proceed with [facility].    AMADOR Lei  Cardiology  [facility] - 6th Floor Medical Telemetry  08/29/2022

## 2022-08-30 VITALS
DIASTOLIC BLOOD PRESSURE: 76 MMHG | HEART RATE: 81 BPM | WEIGHT: 140.44 LBS | BODY MASS INDEX: 25.84 KG/M2 | HEIGHT: 62 IN | TEMPERATURE: 98 F | SYSTOLIC BLOOD PRESSURE: 134 MMHG | RESPIRATION RATE: 16 BRPM | OXYGEN SATURATION: 97 %

## 2022-08-30 PROBLEM — I21.4 NSTEMI (NON-ST ELEVATED MYOCARDIAL INFARCTION): Status: ACTIVE | Noted: 2022-08-30

## 2022-08-30 PROBLEM — I20.0 UNSTABLE ANGINA: Status: RESOLVED | Noted: 2022-08-27 | Resolved: 2022-08-30

## 2022-08-30 LAB
BASOPHILS # BLD AUTO: 0.07 X10(3)/MCL (ref 0–0.2)
BASOPHILS NFR BLD AUTO: 0.6 %
EOSINOPHIL # BLD AUTO: 0.19 X10(3)/MCL (ref 0–0.9)
EOSINOPHIL NFR BLD AUTO: 1.5 %
ERYTHROCYTE [DISTWIDTH] IN BLOOD BY AUTOMATED COUNT: 14 % (ref 11.5–17)
HCT VFR BLD AUTO: 43.4 % (ref 37–47)
HGB BLD-MCNC: 14.3 GM/DL (ref 12–16)
IMM GRANULOCYTES # BLD AUTO: 0.04 X10(3)/MCL (ref 0–0.04)
IMM GRANULOCYTES NFR BLD AUTO: 0.3 %
LYMPHOCYTES # BLD AUTO: 3.89 X10(3)/MCL (ref 0.6–4.6)
LYMPHOCYTES NFR BLD AUTO: 31.2 %
MCH RBC QN AUTO: 29.5 PG (ref 27–31)
MCHC RBC AUTO-ENTMCNC: 32.9 MG/DL (ref 33–36)
MCV RBC AUTO: 89.7 FL (ref 80–94)
MONOCYTES # BLD AUTO: 1.05 X10(3)/MCL (ref 0.1–1.3)
MONOCYTES NFR BLD AUTO: 8.4 %
NEUTROPHILS # BLD AUTO: 7.2 X10(3)/MCL (ref 2.1–9.2)
NEUTROPHILS NFR BLD AUTO: 58 %
NRBC BLD AUTO-RTO: 0 %
PLATELET # BLD AUTO: 223 X10(3)/MCL (ref 130–400)
PMV BLD AUTO: 12 FL (ref 7.4–10.4)
POCT GLUCOSE: 313 MG/DL (ref 70–110)
POCT GLUCOSE: 400 MG/DL (ref 70–110)
RBC # BLD AUTO: 4.84 X10(6)/MCL (ref 4.2–5.4)
WBC # SPEC AUTO: 12.5 X10(3)/MCL (ref 4.5–11.5)

## 2022-08-30 PROCEDURE — 85025 COMPLETE CBC W/AUTO DIFF WBC: CPT | Performed by: NURSE PRACTITIONER

## 2022-08-30 PROCEDURE — 63600175 PHARM REV CODE 636 W HCPCS: Performed by: NURSE PRACTITIONER

## 2022-08-30 PROCEDURE — 25000003 PHARM REV CODE 250: Performed by: NURSE PRACTITIONER

## 2022-08-30 PROCEDURE — 36415 COLL VENOUS BLD VENIPUNCTURE: CPT | Performed by: NURSE PRACTITIONER

## 2022-08-30 RX ORDER — ATORVASTATIN CALCIUM 40 MG/1
40 TABLET, FILM COATED ORAL NIGHTLY
Qty: 90 TABLET | Refills: 3 | Status: SHIPPED | OUTPATIENT
Start: 2022-08-30 | End: 2022-08-30 | Stop reason: SDUPTHER

## 2022-08-30 RX ORDER — ASPIRIN 81 MG/1
81 TABLET ORAL DAILY
Qty: 90 TABLET | Refills: 3 | Status: SHIPPED | OUTPATIENT
Start: 2022-08-31 | End: 2022-08-30 | Stop reason: SDUPTHER

## 2022-08-30 RX ORDER — ASPIRIN 81 MG/1
81 TABLET ORAL DAILY
Qty: 90 TABLET | Refills: 3 | Status: SHIPPED | OUTPATIENT
Start: 2022-08-31 | End: 2023-10-12

## 2022-08-30 RX ORDER — METOPROLOL TARTRATE 25 MG/1
12.5 TABLET, FILM COATED ORAL 2 TIMES DAILY
Qty: 30 TABLET | Refills: 11 | Status: SHIPPED | OUTPATIENT
Start: 2022-08-30 | End: 2023-12-20

## 2022-08-30 RX ORDER — METOPROLOL TARTRATE 25 MG/1
12.5 TABLET, FILM COATED ORAL 2 TIMES DAILY
Qty: 30 TABLET | Refills: 11 | Status: SHIPPED | OUTPATIENT
Start: 2022-08-30 | End: 2022-08-30 | Stop reason: SDUPTHER

## 2022-08-30 RX ORDER — ATORVASTATIN CALCIUM 40 MG/1
40 TABLET, FILM COATED ORAL NIGHTLY
Qty: 90 TABLET | Refills: 3 | Status: SHIPPED | OUTPATIENT
Start: 2022-08-30 | End: 2023-12-27

## 2022-08-30 RX ADMIN — TICAGRELOR 90 MG: 90 TABLET ORAL at 08:08

## 2022-08-30 RX ADMIN — INSULIN ASPART 8 UNITS: 100 INJECTION, SOLUTION INTRAVENOUS; SUBCUTANEOUS at 05:08

## 2022-08-30 RX ADMIN — ASPIRIN 81 MG: 81 TABLET, COATED ORAL at 08:08

## 2022-08-30 RX ADMIN — METOPROLOL TARTRATE 12.5 MG: 25 TABLET, FILM COATED ORAL at 08:08

## 2022-08-30 NOTE — DISCHARGE SUMMARY
[facility] - 6th Floor Medical Telemetry  Cardiology  Discharge Summary    Patient Name: Danitza Lerner  MRN: [id number]  Admission Date: 8/27/2022  Hospital Length of Stay: 3 days  Code Status: Full Code   Attending Physician: Rachna Bustos MD   Primary Care Physician: Primary Doctor No  Expected Discharge Date: 8/30/2022  Principal Problem:<principal problem not specified>    Subjective:     Brief HPI: This is a 57-year-old female, who is unknown to [facility], with history of HTN, dm type 2, HLD, osteoarthritis, CAD/stent.  She presents to the ER at [facility] facility with complaints of mid substernal chest pain, dizziness, weakness nausea, right shoulder pain radiating to her back, increased thirst, and a nose bleed had subsided prior to arrival.  She stated that her arms felt heavy for the past few days.  She stated that she felt weird, weak and sweaty during the episode.  She stated the pain started the day of arrival and she rated 8/10.  Her troponin there was elevated at 0.568.  EKG revealed normal sinus rhythm with a nonspecific T-wave abnormality.  She was transferred to [facility] for higher level of care.  She underwent left heart catheterization which revealed patent RCA stent and 40% mid LAD stenosis.  Right wrist benign.  Follow-up with Dr. Noriega in [place] in 7-10 days.  Patient is stable for discharge.    Hospital Course:  8.29.22: NAD noted. VSS. SR on tele. NPO for [facility] today. Denies CP/SOB/palps  8.30.22: NAD noted. VSS. SR on tele. Denies CP/SOB/Palps. Right wrist benign     PMH: HTN, dm type 2, HLD, osteoarthritis, CAD/stent  PSH: LHC/stent, Caesarean section, tubal ligation  Social History:  Denies EtOH, tobacco, and illicit drug use  Family History:  Noncontributory    Previous Cardiac Diagnostics:   [facility] 8.29.22  Coronary artery disease.  Mild LAD disease.  Unable to engage RCA.  Non selective RCA angiogram shows patent RCA stent with a DARRYN 3 flow distally.    Echo 8.28.22  The left  ventricle is normal in size with normal systolic function.  Normal left ventricular diastolic function.  The estimated ejection fraction is 64%.  No regional wall motion abnormalities noted in this study.  Normal right ventricular size with normal right ventricular systolic function.  Mild mitral regurgitation.  Mild tricuspid regurgitation.  Normal central venous pressure (3 mmHg).    Review of Systems   Constitutional: Negative for chills and fever.   Cardiovascular:  Negative for chest pain and palpitations.   Respiratory:  Negative for shortness of breath.    Psychiatric/Behavioral:  Negative for altered mental status.          Objective:     Vital Signs (Most Recent):  Temp: 98.3 °F (36.8 °C) (08/30/22 0742)  Pulse: 78 (08/30/22 0742)  Resp: 16 (08/30/22 0742)  BP: 134/76 (08/30/22 0742)  SpO2: 95 % (08/30/22 0742) Vital Signs (24h Range):  Temp:  [97.5 °F (36.4 °C)-98.3 °F (36.8 °C)] 98.3 °F (36.8 °C)  Pulse:  [60-83] 78  Resp:  [16-18] 16  SpO2:  [95 %-99 %] 95 %  BP: ()/(60-79) 134/76     Weight: 63.7 kg (140 lb 6.9 oz)  Body mass index is 25.84 kg/m².    SpO2: 95 %  O2 Device (Oxygen Therapy): room air      Intake/Output Summary (Last 24 hours) at 8/30/2022 0830  Last data filed at 8/30/2022 0600  Gross per 24 hour   Intake 2250 ml   Output 3400 ml   Net -1150 ml         Lines/Drains/Airways       Peripheral Intravenous Line  Duration                  Peripheral IV - Single Lumen 08/27/22 2000 22 G Left Antecubital 2 days                    Significant Labs: CMP   Recent Labs   Lab 08/29/22  0441   *   K 4.3   CO2 20*   BUN 17.1   CREATININE 0.78   CALCIUM 9.6   ALBUMIN 3.7   BILITOT 0.6   ALKPHOS 114   AST 14   ALT 13     , CBC   Recent Labs   Lab 08/29/22  0441 08/30/22  0550   WBC 11.0 12.5*   HGB 15.0 14.3   HCT 46.2 43.4    223     , and Troponin   Recent Labs   Lab 08/28/22  1201   TROPONINI 0.124*         Telemetry:  SR    Physical Exam:  Physical Exam  Constitutional:        Appearance: Normal appearance.   HENT:      Head: Atraumatic.   Eyes:      Extraocular Movements: Extraocular movements intact.      Conjunctiva/sclera: Conjunctivae normal.   Cardiovascular:      Rate and Rhythm: Normal rate and regular rhythm.      Pulses: Normal pulses.      Heart sounds: Normal heart sounds.      Comments: Right wrist flat.  No signs of infection, bleeding, edema noted.  Good capillary refill.  Pulmonary:      Effort: Pulmonary effort is normal.      Breath sounds: Normal breath sounds.   Abdominal:      Palpations: Abdomen is soft.   Musculoskeletal:         General: Normal range of motion.      Cervical back: Neck supple.   Skin:     General: Skin is warm and dry.   Neurological:      Mental Status: She is alert and oriented to person, place, and time.   Psychiatric:         Mood and Affect: Mood normal.         Behavior: Behavior normal.       Current Inpatient Medications:    Current Facility-Administered Medications:     aspirin EC tablet 81 mg, 81 mg, Oral, Daily, Lior-Ashok THIBODEAUX Reyes, NP, 81 mg at 08/29/22 0816    atorvastatin tablet 40 mg, 40 mg, Oral, QHS, Joe THIBODEAUX Reyes, NP, 40 mg at 08/29/22 2034    dextrose 10% bolus 125 mL, 12.5 g, Intravenous, PRN, Lior-Ashok W Reyes, NP    dextrose 10% bolus 125 mL, 12.5 g, Intravenous, PRN, Lior-Ashok W Reyes, NP    dextrose 10% bolus 250 mL, 25 g, Intravenous, PRN, Lior-Ashok W Reyes, NP    dextrose 10% bolus 250 mL, 25 g, Intravenous, PRN, Joe W Reyes, NP    fentaNYL injection, , , PRN, Hayley Chiang MD, 50 mcg at 08/29/22 1055    glucagon (human recombinant) injection 1 mg, 1 mg, Intramuscular, PRN, Lior-Ashok W Reyes, NP    glucagon (human recombinant) injection 1 mg, 1 mg, Intramuscular, PRN, Lior-Ashok W Reyes, NP    glucose chewable tablet 16 g, 16 g, Oral, PRN, Lior-Ashok W Reyes, NP    glucose chewable tablet 16 g, 16 g, Oral, PRN, Lior-Ashok W Reyes, NP    glucose chewable  tablet 24 g, 24 g, Oral, PRN, Joe Reyes NP    glucose chewable tablet 24 g, 24 g, Oral, PRN, Joe Reyes NP    heparin (porcine) injection, , , PRN, Hayley Chiang MD, 4,000 Units at 08/29/22 1100    heparin 25,000 units in dextrose 5% (100 units/ml) IV bolus from bag - ADDITIONAL PRN BOLUS - 30 units/kg (max bolus 4000 units), 30 Units/kg (Adjusted), Intravenous, PRN, Joe Reyes NP    heparin 25,000 units in dextrose 5% (100 units/ml) IV bolus from bag - ADDITIONAL PRN BOLUS - 60 units/kg (max bolus 4000 units), 60 Units/kg (Adjusted), Intravenous, PRN, Joe Reyes NP    heparin 25,000 units in dextrose 5% (100 units/ml) IV bolus from bag INITIAL BOLUS (max bolus 4000 units), 60 Units/kg (Adjusted), Intravenous, Once, Joe Reyes NP    heparin 25,000 units in dextrose 5% 250 mL (100 units/mL) infusion LOW INTENSITY nomogram - LAF, 0-40 Units/kg/hr (Adjusted), Intravenous, Continuous, Joe Reyes NP, Last Rate: 10 mL/hr at 08/29/22 0134, 18 Units/kg/hr at 08/29/22 0134    insulin aspart U-100 injection 1-10 Units, 1-10 Units, Subcutaneous, QID (AC + HS) PRN, Joe Reyes NP, 8 Units at 08/30/22 0502    iopamidoL (ISOVUE-370) injection, , , PRN, Hayley Chiang MD, 80 mL at 08/29/22 1120    LIDOcaine HCL 10 mg/ml (1%) injection, , , PRN, Hayley Chiang MD, 10 mL at 08/29/22 1055    metoprolol tartrate (LOPRESSOR) split tablet 12.5 mg, 12.5 mg, Oral, BID, Joe Reyes NP, 12.5 mg at 08/29/22 2035    midazolam (VERSED) 1 mg/mL injection, , , PRN, Hayley Chiang MD, 0.5 mg at 08/29/22 1055    sodium chloride 0.9% flush 10 mL, 10 mL, Intravenous, PRN, Joe Reyes NP    sodium chloride 0.9% flush 10 mL, 10 mL, Intravenous, PRN, AMADOR Lei    ticagrelor tablet 90 mg, 90 mg, Oral, BID, AMADOR Lei, 90 mg at 08/29/22 2034        Assessment:     IMPRESSION:  NSTEMI type II                    -Troponin max 0.568, trended down  CAD/stent to RCA (12.2021)  HTN  HLD  DM II  Osteoarthritis  No documented Hx of GI Bleed         Plan:     PLAN:  Dc home today  Right wrist precauctions/activity restrictions  Continue ASA and Brillinta  F/U with Dr. Noriega in 7-10 days  CBC, CMP in 3 days    DISCHARGE PLAN:  Discharge: Home  Discharge Diet: Cardiac, Low Sodium  Discharge Condition: Stable  Discharge Activity:  As Tolerated, No Heavy Lifting > 5 lbs., Notify MD with Symptoms of Bleed/Infection  Discharge Time: >30minutes    Discharge Medications:        FEDERICO Lei-BC  Cardiology  [facility] - 6th Floor Medical Telemetry  08/30/2022

## 2022-08-30 NOTE — NURSING
Pt given discharge instructions, teachings, and follow up appointments. Patient verbalizes understanding of s/s to look out for. All questions and concerns answered. Will dispatch wheelchair when ready to leave.

## 2022-12-05 PROBLEM — I21.4 NSTEMI (NON-ST ELEVATED MYOCARDIAL INFARCTION): Status: RESOLVED | Noted: 2022-08-30 | Resolved: 2022-12-05

## 2023-01-18 LAB
CRC RECOMMENDATION EXT: NORMAL
LEFT EYE DM RETINOPATHY: POSITIVE
RIGHT EYE DM RETINOPATHY: POSITIVE

## 2023-02-03 ENCOUNTER — DOCUMENTATION ONLY (OUTPATIENT)
Dept: ADMINISTRATIVE | Facility: HOSPITAL | Age: 58
End: 2023-02-03
Payer: MEDICAID

## 2023-02-15 ENCOUNTER — TELEPHONE (OUTPATIENT)
Dept: FAMILY MEDICINE | Facility: CLINIC | Age: 58
End: 2023-02-15

## 2023-02-15 ENCOUNTER — OFFICE VISIT (OUTPATIENT)
Dept: FAMILY MEDICINE | Facility: CLINIC | Age: 58
End: 2023-02-15
Payer: MEDICAID

## 2023-02-15 VITALS
OXYGEN SATURATION: 96 % | WEIGHT: 146 LBS | TEMPERATURE: 99 F | SYSTOLIC BLOOD PRESSURE: 118 MMHG | HEART RATE: 92 BPM | DIASTOLIC BLOOD PRESSURE: 62 MMHG | HEIGHT: 62 IN | BODY MASS INDEX: 26.87 KG/M2

## 2023-02-15 VITALS
BODY MASS INDEX: 27.22 KG/M2 | TEMPERATURE: 98 F | OXYGEN SATURATION: 95 % | HEART RATE: 88 BPM | SYSTOLIC BLOOD PRESSURE: 110 MMHG | DIASTOLIC BLOOD PRESSURE: 62 MMHG | HEIGHT: 62 IN | WEIGHT: 147.94 LBS

## 2023-02-15 DIAGNOSIS — R50.9 FEVER, UNSPECIFIED FEVER CAUSE: ICD-10-CM

## 2023-02-15 DIAGNOSIS — J02.9 SORE THROAT: ICD-10-CM

## 2023-02-15 DIAGNOSIS — R09.81 NASAL CONGESTION: ICD-10-CM

## 2023-02-15 DIAGNOSIS — J10.1 INFLUENZA A: Primary | ICD-10-CM

## 2023-02-15 DIAGNOSIS — R05.9 COUGH, UNSPECIFIED TYPE: ICD-10-CM

## 2023-02-15 PROBLEM — I25.10 ARTERIOSCLEROSIS OF CORONARY ARTERY: Status: ACTIVE | Noted: 2023-02-15

## 2023-02-15 PROBLEM — I21.9 MYOCARDIAL INFARCTION: Status: ACTIVE | Noted: 2022-08-27

## 2023-02-15 PROBLEM — E10.9 BRITTLE DIABETES MELLITUS: Status: ACTIVE | Noted: 2023-02-15

## 2023-02-15 PROBLEM — I25.2 HISTORY OF MI (MYOCARDIAL INFARCTION): Status: ACTIVE | Noted: 2022-08-27

## 2023-02-15 PROBLEM — I10 HYPERTENSION: Status: ACTIVE | Noted: 2023-02-15

## 2023-02-15 PROBLEM — E78.5 HYPERLIPIDEMIA: Status: ACTIVE | Noted: 2023-02-15

## 2023-02-15 PROBLEM — F32.9 MAJOR DEPRESSIVE DISORDER: Status: ACTIVE | Noted: 2023-02-15

## 2023-02-15 PROBLEM — E10.65 UNCONTROLLED TYPE 1 DIABETES MELLITUS WITH HYPERGLYCEMIA: Status: ACTIVE | Noted: 2023-02-15

## 2023-02-15 PROBLEM — Z95.5 PRESENCE OF STENT IN CORONARY ARTERY: Status: ACTIVE | Noted: 2023-02-15

## 2023-02-15 LAB
CTP QC/QA: YES
FLUAV AG NPH QL: POSITIVE
FLUBV AG NPH QL: NEGATIVE
MOLECULAR STREP A: NEGATIVE
SARS-COV-2 AG RESP QL IA.RAPID: NEGATIVE

## 2023-02-15 PROCEDURE — 87651 STREP A DNA AMP PROBE: CPT | Mod: QW,,, | Performed by: NURSE PRACTITIONER

## 2023-02-15 PROCEDURE — 99214 OFFICE O/P EST MOD 30 MIN: CPT | Mod: ,,, | Performed by: NURSE PRACTITIONER

## 2023-02-15 PROCEDURE — 3078F DIAST BP <80 MM HG: CPT | Mod: CPTII,,, | Performed by: NURSE PRACTITIONER

## 2023-02-15 PROCEDURE — 87651 POCT STREP A MOLECULAR: ICD-10-PCS | Mod: QW,,, | Performed by: NURSE PRACTITIONER

## 2023-02-15 PROCEDURE — 87811 SARS-COV-2 COVID19 W/OPTIC: CPT | Mod: QW,,, | Performed by: NURSE PRACTITIONER

## 2023-02-15 PROCEDURE — 87811 SARS CORONAVIRUS 2 ANTIGEN POCT, MANUAL READ: ICD-10-PCS | Mod: QW,,, | Performed by: NURSE PRACTITIONER

## 2023-02-15 PROCEDURE — 1159F PR MEDICATION LIST DOCUMENTED IN MEDICAL RECORD: ICD-10-PCS | Mod: CPTII,,, | Performed by: NURSE PRACTITIONER

## 2023-02-15 PROCEDURE — 3008F BODY MASS INDEX DOCD: CPT | Mod: CPTII,,, | Performed by: NURSE PRACTITIONER

## 2023-02-15 PROCEDURE — 4010F PR ACE/ARB THEARPY RXD/TAKEN: ICD-10-PCS | Mod: CPTII,,, | Performed by: NURSE PRACTITIONER

## 2023-02-15 PROCEDURE — 3074F PR MOST RECENT SYSTOLIC BLOOD PRESSURE < 130 MM HG: ICD-10-PCS | Mod: CPTII,,, | Performed by: NURSE PRACTITIONER

## 2023-02-15 PROCEDURE — 87400 INFLUENZA A/B EACH AG IA: CPT | Mod: QW,,, | Performed by: NURSE PRACTITIONER

## 2023-02-15 PROCEDURE — 87400 POCT INFLUENZA A/B: ICD-10-PCS | Mod: QW,,, | Performed by: NURSE PRACTITIONER

## 2023-02-15 PROCEDURE — 1160F PR REVIEW ALL MEDS BY PRESCRIBER/CLIN PHARMACIST DOCUMENTED: ICD-10-PCS | Mod: CPTII,,, | Performed by: NURSE PRACTITIONER

## 2023-02-15 PROCEDURE — 1159F MED LIST DOCD IN RCRD: CPT | Mod: CPTII,,, | Performed by: NURSE PRACTITIONER

## 2023-02-15 PROCEDURE — 3074F SYST BP LT 130 MM HG: CPT | Mod: CPTII,,, | Performed by: NURSE PRACTITIONER

## 2023-02-15 PROCEDURE — 4010F ACE/ARB THERAPY RXD/TAKEN: CPT | Mod: CPTII,,, | Performed by: NURSE PRACTITIONER

## 2023-02-15 PROCEDURE — 99214 PR OFFICE/OUTPT VISIT, EST, LEVL IV, 30-39 MIN: ICD-10-PCS | Mod: ,,, | Performed by: NURSE PRACTITIONER

## 2023-02-15 PROCEDURE — 3008F PR BODY MASS INDEX (BMI) DOCUMENTED: ICD-10-PCS | Mod: CPTII,,, | Performed by: NURSE PRACTITIONER

## 2023-02-15 PROCEDURE — 1160F RVW MEDS BY RX/DR IN RCRD: CPT | Mod: CPTII,,, | Performed by: NURSE PRACTITIONER

## 2023-02-15 PROCEDURE — 3078F PR MOST RECENT DIASTOLIC BLOOD PRESSURE < 80 MM HG: ICD-10-PCS | Mod: CPTII,,, | Performed by: NURSE PRACTITIONER

## 2023-02-15 RX ORDER — PEN NEEDLE, DIABETIC 31 GX5/16"
1 NEEDLE, DISPOSABLE MISCELLANEOUS 3 TIMES DAILY
COMMUNITY
Start: 2022-09-02 | End: 2023-04-18 | Stop reason: SDUPTHER

## 2023-02-15 RX ORDER — PRASUGREL 10 MG/1
10 TABLET, FILM COATED ORAL
COMMUNITY
Start: 2023-01-23 | End: 2023-10-12 | Stop reason: ALTCHOICE

## 2023-02-15 RX ORDER — CEPHALEXIN 500 MG/1
500 CAPSULE ORAL 4 TIMES DAILY
COMMUNITY
Start: 2022-09-02

## 2023-02-15 RX ORDER — OSELTAMIVIR PHOSPHATE 75 MG/1
75 CAPSULE ORAL 2 TIMES DAILY
Qty: 10 CAPSULE | Refills: 0 | Status: SHIPPED | OUTPATIENT
Start: 2023-02-15 | End: 2023-02-20

## 2023-02-15 RX ORDER — CETIRIZINE HYDROCHLORIDE 10 MG/1
10 TABLET ORAL
COMMUNITY
Start: 2023-02-06 | End: 2023-04-05

## 2023-02-15 RX ORDER — INSULIN LISPRO 100 [IU]/ML
INJECTION, SOLUTION INTRAVENOUS; SUBCUTANEOUS
COMMUNITY
Start: 2022-04-14 | End: 2023-04-17

## 2023-02-15 RX ORDER — LISINOPRIL 2.5 MG/1
2.5 TABLET ORAL
COMMUNITY
Start: 2023-01-17 | End: 2023-04-17

## 2023-02-15 RX ORDER — CALCIUM CITRATE/VITAMIN D3 200MG-6.25
1 TABLET ORAL 4 TIMES DAILY
COMMUNITY
Start: 2023-02-09

## 2023-02-15 RX ORDER — INSULIN GLARGINE 100 [IU]/ML
INJECTION, SOLUTION SUBCUTANEOUS
COMMUNITY
Start: 2021-12-06 | End: 2023-05-02 | Stop reason: SDUPTHER

## 2023-02-15 RX ORDER — MONTELUKAST SODIUM 10 MG/1
10 TABLET ORAL
COMMUNITY
Start: 2023-01-18 | End: 2023-04-18

## 2023-02-15 RX ORDER — LANCETS 32 GAUGE
EACH MISCELLANEOUS
COMMUNITY
Start: 2023-01-04

## 2023-02-15 RX ORDER — EZETIMIBE 10 MG/1
10 TABLET ORAL
COMMUNITY
Start: 2023-02-03

## 2023-02-15 RX ORDER — DICLOFENAC SODIUM 10 MG/G
2 GEL TOPICAL
COMMUNITY
Start: 2022-09-14

## 2023-02-15 RX ORDER — PAROXETINE 30 MG/1
30 TABLET, FILM COATED ORAL
COMMUNITY
Start: 2023-02-14 | End: 2023-04-14

## 2023-02-15 RX ORDER — FLUTICASONE PROPIONATE 50 MCG
SPRAY, SUSPENSION (ML) NASAL
COMMUNITY
Start: 2022-05-09

## 2023-02-15 NOTE — TELEPHONE ENCOUNTER
----- Message from Linh Barrera sent at 2/15/2023  9:55 AM CST -----  Regarding: Strep Throat Appt  Nephew started with strep throat, He went into septic shock and passed away. She was in the room when he passed, So she was exposed to Strep. She is starting to have Sinus Symptoms. And would like to be seen to be tested for Strep.     Please advise     Danitza Lerner   [phone]

## 2023-02-15 NOTE — PROGRESS NOTES
Patient ID: Danitza Lerner  : 1965     Chief Complaint: Sore Throat (Feeling bad since this morning, asked to be tested for Strep)    Allergies: Patient has No Known Allergies.     History of Present Illness:  The patient is a 57 y.o. White female patient of [email or web address] who presents to clinic for evaluation and management with a chief complaint of Sore Throat (Feeling bad since this morning, asked to be tested for Strep)   Patient is concerned about symptoms since her nephew passed away from sepsis and strep infection. Patient would like to be swabbed. She is unsure about closer to flu or COVID    Sore Throat   This is a new problem. The current episode started today. The problem has been unchanged. The maximum temperature recorded prior to her arrival was 100.4 - 100.9 F. The fever has been present for Less than 1 day. The pain is at a severity of 6/10. The pain is moderate. Associated symptoms include congestion and coughing. Pertinent negatives include no abdominal pain, diarrhea, drooling, ear discharge, ear pain, headaches, hoarse voice, plugged ear sensation, shortness of breath, stridor, swollen glands, trouble swallowing or vomiting.   Sinus Problem  This is a new problem. The current episode started today. The maximum temperature recorded prior to her arrival was 100.4 - 100.9 F. The fever has been present for Less than 1 day. Her pain is at a severity of 6/10. Associated symptoms include chills, congestion, coughing, sinus pressure and a sore throat. Pertinent negatives include no diaphoresis, ear pain, headaches, hoarse voice, shortness of breath, sneezing or swollen glands. The treatment provided mild relief.      Past Medical History:  has a past medical history of CAD (coronary artery disease), Depression, HLD (hyperlipidemia), HTN (hypertension), Myocardial infarction, Osteoarthritis, Presence of stent in coronary artery, and Type 1 diabetes.    Social History:  reports that she has been smoking  "cigarettes. She has been smoking an average of 1 pack per day. She does not have any smokeless tobacco history on file.    Care Team: Patient Care Team:  TRENA Talbot as PCP - General (Family Medicine)     Current Medications:  Current Outpatient Medications   Medication Instructions    aspirin (ECOTRIN) 81 mg, Oral, Daily    atorvastatin (LIPITOR) 40 mg, Oral, Nightly    BD ULTRA-FINE DONNY PEN NEEDLE 32 gauge x 5/32" Ndle 1 each, Subcutaneous, 3 times daily    cephALEXin (KEFLEX) 500 mg, Oral, 4 times daily    cetirizine (ZYRTEC) 10 mg, Oral    diclofenac sodium (VOLTAREN) 2 g, Topical    EASY TOUCH TWIST LANCETS 32 gauge Misc No dose, route, or frequency recorded.    ezetimibe (ZETIA) 10 mg, Oral    fluticasone propionate (FLONASE) 50 mcg/actuation nasal spray Nasal    insulin glargine (LANTUS) 100 unit/mL injection Subcutaneous    insulin lispro 100 unit/mL pen   See Instructions, USE PER SLIDING (UP TO 50 UNITS PER DAY), # 15 mL, 11 Refill(s), Pharmacy: [place], [place], 159.3, cm, Height/Length Dosing, 12/06/21 11:01:00 CST, 69.3, kg, Weight Dosing, 12/06/21 11:01:00 CST    lisinopriL (PRINIVIL,ZESTRIL) 2.5 mg, Oral    metoprolol tartrate (LOPRESSOR) 12.5 mg, Oral, 2 times daily    montelukast (SINGULAIR) 10 mg, Oral    paroxetine (PAXIL) 30 mg, Oral    prasugreL (EFFIENT) 10 mg, Oral    ticagrelor (BRILINTA) 90 mg, Oral, 2 times daily    TRUE METRIX GLUCOSE TEST STRIP Strp 1 strip, 4 times daily       Review of Systems   Constitutional:  Positive for chills. Negative for diaphoresis.   HENT:  Positive for nasal congestion, postnasal drip, rhinorrhea, sinus pressure/congestion and sore throat. Negative for drooling, ear discharge, ear pain, hoarse voice, sneezing and trouble swallowing.    Respiratory:  Positive for cough. Negative for shortness of breath and stridor.    Cardiovascular:  Negative for chest pain and leg swelling.   Gastrointestinal:  Negative for abdominal pain, diarrhea " "and vomiting.   Genitourinary:  Negative for dysuria.   Integumentary:  Negative for rash and mole/lesion.   Neurological:  Negative for tremors, weakness and headaches.   Psychiatric/Behavioral:  Negative for confusion, sleep disturbance and suicidal ideas.       Visit Vitals  /62 (BP Location: Right arm)   Pulse 92   Temp 99.3 °F (37.4 °C) (Oral)   Ht 5' 2.21" (1.58 m)   Wt 66.2 kg (146 lb)   SpO2 96%   BMI 26.52 kg/m²       Physical Exam  Vitals reviewed. Exam conducted with a chaperone present.   Constitutional:       General: She is not in acute distress.     Appearance: Normal appearance.   HENT:      Head: Normocephalic and atraumatic.      Right Ear: Ear canal and external ear normal. No drainage or tenderness. A middle ear effusion is present. Tympanic membrane is not erythematous.      Left Ear: Ear canal and external ear normal. No drainage or tenderness. A middle ear effusion is present. Tympanic membrane is not erythematous.      Nose: Congestion and rhinorrhea present. Rhinorrhea is clear.      Mouth/Throat:      Mouth: Mucous membranes are moist.      Pharynx: Oropharynx is clear. Posterior oropharyngeal erythema present. No oropharyngeal exudate.      Tonsils: No tonsillar exudate. 2+ on the right. 2+ on the left.   Cardiovascular:      Rate and Rhythm: Normal rate and regular rhythm.      Pulses: Normal pulses.      Heart sounds: No murmur heard.  Pulmonary:      Effort: Pulmonary effort is normal.      Breath sounds: Normal breath sounds.   Musculoskeletal:         General: No swelling.      Cervical back: Neck supple.   Lymphadenopathy:      Cervical: No cervical adenopathy.   Skin:     General: Skin is warm and dry.      Findings: No rash.   Neurological:      Mental Status: She is alert and oriented to person, place, and time.   Psychiatric:         Mood and Affect: Mood normal.         Behavior: Behavior normal.      Assessment & Plan:  1. Influenza A  Comments:  discussed rest, hydration " and symptom treatment. Declines need for cough or nausea medication. Discussed otc medications.  Orders:  -     oseltamivir (TAMIFLU) 75 MG capsule; Take 1 capsule (75 mg total) by mouth 2 (two) times daily. for 5 days  Dispense: 10 capsule; Refill: 0    2. Sore throat  -     Throat Screen, Rapid Strep  -     POCT Strep A, Molecular    3. Nasal congestion  -     POCT Influenza A/B  -     SARS Coronavirus 2 Antigen, POCT Manual Read  -     POCT Strep A, Molecular    4. Cough, unspecified type  -     POCT Strep A, Molecular    5. Fever, unspecified fever cause  -     POCT Influenza A/B  -     SARS Coronavirus 2 Antigen, POCT Manual Read  -     Throat Screen, Rapid Strep  -     POCT Strep A, Molecular         Future Appointments   Date Time Provider Department Center   3/30/2023  9:40 AM LAB, [facility] LABORATORY DRAW STATION [facility] DEA Carter [facility]   4/6/2023  2[facility]       Follow up if symptoms worsen or fail to improve. Call sooner if needed.    EMILI KNOTT

## 2023-04-11 PROCEDURE — 80048 BASIC METABOLIC PNL TOTAL CA: CPT | Performed by: NURSE PRACTITIONER

## 2023-04-11 PROCEDURE — 83036 HEMOGLOBIN GLYCOSYLATED A1C: CPT | Performed by: NURSE PRACTITIONER

## 2023-04-14 DIAGNOSIS — F32.9 MAJOR DEPRESSIVE DISORDER, REMISSION STATUS UNSPECIFIED, UNSPECIFIED WHETHER RECURRENT: Primary | ICD-10-CM

## 2023-04-14 RX ORDER — PAROXETINE 30 MG/1
TABLET, FILM COATED ORAL
Qty: 30 TABLET | Refills: 11 | Status: SHIPPED | OUTPATIENT
Start: 2023-04-14

## 2023-04-18 ENCOUNTER — OFFICE VISIT (OUTPATIENT)
Dept: FAMILY MEDICINE | Facility: CLINIC | Age: 58
End: 2023-04-18
Payer: MEDICAID

## 2023-04-18 VITALS
BODY MASS INDEX: 27.83 KG/M2 | HEIGHT: 62 IN | OXYGEN SATURATION: 98 % | TEMPERATURE: 98 F | HEART RATE: 65 BPM | SYSTOLIC BLOOD PRESSURE: 124 MMHG | DIASTOLIC BLOOD PRESSURE: 86 MMHG | WEIGHT: 151.25 LBS

## 2023-04-18 DIAGNOSIS — Z72.0 TOBACCO USER: ICD-10-CM

## 2023-04-18 DIAGNOSIS — E10.65 UNCONTROLLED TYPE 1 DIABETES MELLITUS WITH HYPERGLYCEMIA: Primary | ICD-10-CM

## 2023-04-18 PROCEDURE — 99214 OFFICE O/P EST MOD 30 MIN: CPT | Mod: ,,, | Performed by: NURSE PRACTITIONER

## 2023-04-18 PROCEDURE — 3079F PR MOST RECENT DIASTOLIC BLOOD PRESSURE 80-89 MM HG: ICD-10-PCS | Mod: CPTII,,, | Performed by: NURSE PRACTITIONER

## 2023-04-18 PROCEDURE — 4010F PR ACE/ARB THEARPY RXD/TAKEN: ICD-10-PCS | Mod: CPTII,,, | Performed by: NURSE PRACTITIONER

## 2023-04-18 PROCEDURE — 3008F PR BODY MASS INDEX (BMI) DOCUMENTED: ICD-10-PCS | Mod: CPTII,,, | Performed by: NURSE PRACTITIONER

## 2023-04-18 PROCEDURE — 1159F MED LIST DOCD IN RCRD: CPT | Mod: CPTII,,, | Performed by: NURSE PRACTITIONER

## 2023-04-18 PROCEDURE — 99214 PR OFFICE/OUTPT VISIT, EST, LEVL IV, 30-39 MIN: ICD-10-PCS | Mod: ,,, | Performed by: NURSE PRACTITIONER

## 2023-04-18 PROCEDURE — 3008F BODY MASS INDEX DOCD: CPT | Mod: CPTII,,, | Performed by: NURSE PRACTITIONER

## 2023-04-18 PROCEDURE — 3074F PR MOST RECENT SYSTOLIC BLOOD PRESSURE < 130 MM HG: ICD-10-PCS | Mod: CPTII,,, | Performed by: NURSE PRACTITIONER

## 2023-04-18 PROCEDURE — 1160F PR REVIEW ALL MEDS BY PRESCRIBER/CLIN PHARMACIST DOCUMENTED: ICD-10-PCS | Mod: CPTII,,, | Performed by: NURSE PRACTITIONER

## 2023-04-18 PROCEDURE — 3079F DIAST BP 80-89 MM HG: CPT | Mod: CPTII,,, | Performed by: NURSE PRACTITIONER

## 2023-04-18 PROCEDURE — 4010F ACE/ARB THERAPY RXD/TAKEN: CPT | Mod: CPTII,,, | Performed by: NURSE PRACTITIONER

## 2023-04-18 PROCEDURE — 1160F RVW MEDS BY RX/DR IN RCRD: CPT | Mod: CPTII,,, | Performed by: NURSE PRACTITIONER

## 2023-04-18 PROCEDURE — 1159F PR MEDICATION LIST DOCUMENTED IN MEDICAL RECORD: ICD-10-PCS | Mod: CPTII,,, | Performed by: NURSE PRACTITIONER

## 2023-04-18 PROCEDURE — 3074F SYST BP LT 130 MM HG: CPT | Mod: CPTII,,, | Performed by: NURSE PRACTITIONER

## 2023-04-18 RX ORDER — INSULIN LISPRO 100 [IU]/ML
1 INJECTION, SOLUTION SUBCUTANEOUS
COMMUNITY
Start: 2021-12-27 | End: 2023-04-18 | Stop reason: SDUPTHER

## 2023-04-18 RX ORDER — INSULIN PUMP SYRINGE, 3 ML
EACH MISCELLANEOUS
Qty: 1 EACH | Refills: 0 | Status: SHIPPED | OUTPATIENT
Start: 2023-04-18

## 2023-04-18 RX ORDER — PEN NEEDLE, DIABETIC 30 GX3/16"
1 NEEDLE, DISPOSABLE MISCELLANEOUS 4 TIMES DAILY
Qty: 200 EACH | Refills: 11 | Status: SHIPPED | OUTPATIENT
Start: 2023-04-18

## 2023-04-18 NOTE — PROGRESS NOTES
"Patient ID: Danitza Lerner  : 1965    Chief Complaint: Diabetes and Hypertension (Follow up)    Allergies: Patient has No Known Allergies.     History of Present Illness:  The patient is a 57 y.o. White female who presents to clinic for follow up on Diabetes and Hypertension (Follow up)     Here for 3 month f/u on DM1.  Last A1c 10.4%, now 9.5%.  She has been directed to take a base amount of insulin at mealtimes along with an additional insulin per moderate sliding scale. However she does not always follow this even though her preprandial readings are always nearly 200 or greater.  Has had some lows, was 47 the other day but she forgot to eat lunch. She still reports some highs as high 450 at times; which correlates to times when she eats sweets or high carb foods. She did have freestyle Wale in the past but had trouble with getting reader to stay on her skin and failed to follow advice to apply Tegaderm to hold it in place.  She would prefer to check manually.  Attempts to titrate and is in past was unsuccessful due to some extreme morning lows. Have previously sent referral to Endocrinology but they are unable to attend out of town appointments and she will not travel on medical transport van by herself.       Social History:  reports that she has been smoking cigarettes. She has a 35.00 pack-year smoking history. She has been exposed to tobacco smoke. She does not have any smokeless tobacco history on file.    Past Medical History:  has a past medical history of CAD (coronary artery disease), Depression, HLD (hyperlipidemia), HTN (hypertension), Myocardial infarction, Osteoarthritis, Presence of stent in coronary artery, and Type 1 diabetes.    Current Medications:  Current Outpatient Medications   Medication Instructions    aspirin (ECOTRIN) 81 mg, Oral, Daily    atorvastatin (LIPITOR) 40 mg, Oral, Nightly    BD ULTRA-FINE DONNY PEN NEEDLE 32 gauge x 5/32" Ndle 1 each, Subcutaneous, 3 times daily    " "blood sugar diagnostic Strp 1 strip, Misc.(Non-Drug; Combo Route), 3 times daily    cephALEXin (KEFLEX) 500 mg, Oral, 4 times daily    cetirizine (ZYRTEC) 10 MG tablet TAKE ONE TABLET BY MOUTH EVERY DAY    diclofenac sodium (VOLTAREN) 2 g, Topical    EASY TOUCH TWIST LANCETS 32 gauge Misc No dose, route, or frequency recorded.    ezetimibe (ZETIA) 10 mg, Oral    fluticasone propionate (FLONASE) 50 mcg/actuation nasal spray Nasal    insulin glargine (LANTUS) 100 unit/mL injection Subcutaneous    insulin lispro 100 unit/mL pen USE PER SLIDING (UP TO 50 UNITS PER DAY)    lisinopriL (PRINIVIL,ZESTRIL) 2.5 MG tablet TAKE ONE TABLET BY MOUTH EVERY DAY    metoprolol tartrate (LOPRESSOR) 12.5 mg, Oral, 2 times daily    montelukast (SINGULAIR) 10 mg, Oral    paroxetine (PAXIL) 30 MG tablet TAKE ONE TABLET BY MOUTH EVERY DAY    prasugreL (EFFIENT) 10 mg, Oral    ticagrelor (BRILINTA) 90 mg, Oral, 2 times daily    TRUE METRIX GLUCOSE TEST STRIP Strp 1 strip, 4 times daily       Review of Systems   See HPI    Visit Vitals  BP 92/60 (BP Location: Left arm, Patient Position: Sitting)   Pulse 65   Temp 98.1 °F (36.7 °C) (Temporal)   Ht 5' 2.21" (1.58 m)   Wt 68.6 kg (151 lb 3.8 oz)   SpO2 98%   BMI 27.47 kg/m²       Physical Exam  Constitutional:       Appearance: Normal appearance.   Cardiovascular:      Heart sounds: Normal heart sounds.   Pulmonary:      Breath sounds: Normal breath sounds.   Skin:     General: Skin is warm and dry.   Psychiatric:         Mood and Affect: Mood normal.          Labs Reviewed:  Chemistry:  Lab Results   Component Value Date     04/11/2023    K 5.1 04/11/2023    CHLORIDE 104 04/11/2023    BUN 13.0 04/11/2023    CREATININE 0.65 (L) 04/11/2023    EGFRNORACEVR >90 04/11/2023    GLUCOSE 194 (H) 04/11/2023    CALCIUM 9.1 04/11/2023    ALKPHOS 114 08/29/2022    LABPROT 6.8 08/29/2022    ALBUMIN 3.7 08/29/2022    AST 14 08/29/2022    ALT 13 08/29/2022        Lab Results   Component Value Date    " HGBA1C 9.5 (H) 04/11/2023        Assessment & Plan:    1. Uncontrolled type 1 diabetes mellitus with hyperglycemia  Continue current dose of Lantus 20 units Qpm and 16 units Qam and continue with moderate dose sliding scale.   Take all medications as directed; compliance is critical for managing your diabetes.   Follow American Diabetic Association (ADA) dietary guidelines for eating and implement exercise into your daily regimen.   Check your glucose levels each morning and record for review at follow up in 3 months.    2. Tobacco user  Smoking cessation encouraged.           Follow up in about 3 months (around 7/18/2023) for labs prior; diabetes f/u. Call sooner if needed.    Lisandra Flores, KASHMIRP-C

## 2023-05-02 ENCOUNTER — TELEPHONE (OUTPATIENT)
Dept: FAMILY MEDICINE | Facility: CLINIC | Age: 58
End: 2023-05-02
Payer: MEDICAID

## 2023-05-02 DIAGNOSIS — E10.65 UNCONTROLLED TYPE 1 DIABETES MELLITUS WITH HYPERGLYCEMIA: Primary | ICD-10-CM

## 2023-05-02 RX ORDER — INSULIN GLARGINE 100 [IU]/ML
36 INJECTION, SOLUTION SUBCUTANEOUS DAILY
Qty: 15 ML | Refills: 11 | Status: SHIPPED | OUTPATIENT
Start: 2023-05-02

## 2023-05-02 NOTE — TELEPHONE ENCOUNTER
Pt stated that the pharmacy told her medicaid won't pay for her Lantus until the end of this month. I reviewed the directions that are on the rx. She said that she is on a higher dosage now, 20u qPM and 16u qAM. I sent a refill with the correct dosage.

## 2023-06-27 ENCOUNTER — TELEPHONE (OUTPATIENT)
Dept: FAMILY MEDICINE | Facility: CLINIC | Age: 58
End: 2023-06-27
Payer: MEDICAID

## 2023-06-27 NOTE — TELEPHONE ENCOUNTER
----- Message from Nena Feng MA sent at 6/26/2023  8:40 AM CDT -----  Regarding: Cervical Cancer Screening  Our records indicate the patient is due for a pap smear. Please find out if the patient has had one done in the last 3-5 years, so we can obtain those records.  If not, please send a referral to an OBGYN of her choice?

## 2023-06-27 NOTE — TELEPHONE ENCOUNTER
Recording states wireless customer is not available. Pt has an appointment on 7/11. Note made for Lisandra to address at the visit

## 2023-07-05 PROCEDURE — 80053 COMPREHEN METABOLIC PANEL: CPT | Performed by: NURSE PRACTITIONER

## 2023-07-05 PROCEDURE — 83036 HEMOGLOBIN GLYCOSYLATED A1C: CPT | Performed by: NURSE PRACTITIONER

## 2023-07-11 ENCOUNTER — OFFICE VISIT (OUTPATIENT)
Dept: FAMILY MEDICINE | Facility: CLINIC | Age: 58
End: 2023-07-11
Payer: MEDICAID

## 2023-07-11 VITALS
HEIGHT: 62 IN | SYSTOLIC BLOOD PRESSURE: 118 MMHG | DIASTOLIC BLOOD PRESSURE: 80 MMHG | TEMPERATURE: 97 F | OXYGEN SATURATION: 98 % | BODY MASS INDEX: 27.45 KG/M2 | HEART RATE: 78 BPM | WEIGHT: 149.19 LBS

## 2023-07-11 DIAGNOSIS — E10.65 UNCONTROLLED TYPE 1 DIABETES MELLITUS WITH HYPERGLYCEMIA: ICD-10-CM

## 2023-07-11 PROBLEM — I10 HTN (HYPERTENSION): Status: ACTIVE | Noted: 2022-08-27

## 2023-07-11 PROBLEM — M19.90 OSTEOARTHRITIS: Status: ACTIVE | Noted: 2022-08-27

## 2023-07-11 PROBLEM — F17.210 CIGARETTE SMOKER: Status: ACTIVE | Noted: 2022-08-27

## 2023-07-11 PROBLEM — Z98.51 HX OF TUBAL LIGATION: Status: ACTIVE | Noted: 2022-08-27

## 2023-07-11 PROBLEM — Z98.891 HISTORY OF 2 CESAREAN SECTIONS: Status: ACTIVE | Noted: 2022-08-27

## 2023-07-11 PROBLEM — Z95.5 S/P RIGHT CORONARY ARTERY (RCA) STENT PLACEMENT: Status: ACTIVE | Noted: 2023-05-11

## 2023-07-11 PROCEDURE — 1159F MED LIST DOCD IN RCRD: CPT | Mod: CPTII,,, | Performed by: NURSE PRACTITIONER

## 2023-07-11 PROCEDURE — 1160F RVW MEDS BY RX/DR IN RCRD: CPT | Mod: CPTII,,, | Performed by: NURSE PRACTITIONER

## 2023-07-11 PROCEDURE — 3046F PR MOST RECENT HEMOGLOBIN A1C LEVEL > 9.0%: ICD-10-PCS | Mod: CPTII,,, | Performed by: NURSE PRACTITIONER

## 2023-07-11 PROCEDURE — 3008F BODY MASS INDEX DOCD: CPT | Mod: CPTII,,, | Performed by: NURSE PRACTITIONER

## 2023-07-11 PROCEDURE — 99214 OFFICE O/P EST MOD 30 MIN: CPT | Mod: ,,, | Performed by: NURSE PRACTITIONER

## 2023-07-11 PROCEDURE — 1160F PR REVIEW ALL MEDS BY PRESCRIBER/CLIN PHARMACIST DOCUMENTED: ICD-10-PCS | Mod: CPTII,,, | Performed by: NURSE PRACTITIONER

## 2023-07-11 PROCEDURE — 1159F PR MEDICATION LIST DOCUMENTED IN MEDICAL RECORD: ICD-10-PCS | Mod: CPTII,,, | Performed by: NURSE PRACTITIONER

## 2023-07-11 PROCEDURE — 3046F HEMOGLOBIN A1C LEVEL >9.0%: CPT | Mod: CPTII,,, | Performed by: NURSE PRACTITIONER

## 2023-07-11 PROCEDURE — 4010F PR ACE/ARB THEARPY RXD/TAKEN: ICD-10-PCS | Mod: CPTII,,, | Performed by: NURSE PRACTITIONER

## 2023-07-11 PROCEDURE — 99214 PR OFFICE/OUTPT VISIT, EST, LEVL IV, 30-39 MIN: ICD-10-PCS | Mod: ,,, | Performed by: NURSE PRACTITIONER

## 2023-07-11 PROCEDURE — 3079F PR MOST RECENT DIASTOLIC BLOOD PRESSURE 80-89 MM HG: ICD-10-PCS | Mod: CPTII,,, | Performed by: NURSE PRACTITIONER

## 2023-07-11 PROCEDURE — 3008F PR BODY MASS INDEX (BMI) DOCUMENTED: ICD-10-PCS | Mod: CPTII,,, | Performed by: NURSE PRACTITIONER

## 2023-07-11 PROCEDURE — 3074F SYST BP LT 130 MM HG: CPT | Mod: CPTII,,, | Performed by: NURSE PRACTITIONER

## 2023-07-11 PROCEDURE — 3079F DIAST BP 80-89 MM HG: CPT | Mod: CPTII,,, | Performed by: NURSE PRACTITIONER

## 2023-07-11 PROCEDURE — 4010F ACE/ARB THERAPY RXD/TAKEN: CPT | Mod: CPTII,,, | Performed by: NURSE PRACTITIONER

## 2023-07-11 PROCEDURE — 3074F PR MOST RECENT SYSTOLIC BLOOD PRESSURE < 130 MM HG: ICD-10-PCS | Mod: CPTII,,, | Performed by: NURSE PRACTITIONER

## 2023-07-11 NOTE — PROGRESS NOTES
"Patient ID: Danitza Lerner  : 1965    Chief Complaint: Follow-up (On diabetes and labs)    Allergies: Patient has No Known Allergies.     History of Present Illness:  The patient is a 58 y.o. White female who presents to clinic for follow up on Follow-up (On diabetes and labs)     Here for 3 month f/u on DM1.  Last A1c 9.5%, now 9.4%.  She has been directed to take a base amount of insulin at mealtimes along with an additional insulin per moderate sliding scale (her baseline dose is 4 units). However she does not always follow this even though her preprandial readings are always nearly 200 or greater.  Has had a history of pretty extreme "lows" at times. Has only had one low since last seen, it was 37. She had taken her Lispro and then did not end up eating. She still reports some highs as high 500's at times; which correlates to times when she eats sweets or high carb foods. She did have freestyle Wale in the past but had trouble with getting reader to stay on her skin and failed to follow advice to apply Tegaderm to hold it in place.  She would prefer to check manually. Attempts to titrate insulin in the past was unsuccessful due to some extreme morning lows. Have previously sent referral to Endocrinology but they are unable to attend out of town appointments and she will not travel on medical transport van by herself.     Social History:  reports that she has been smoking cigarettes. She has a 35.00 pack-year smoking history. She has been exposed to tobacco smoke. She does not have any smokeless tobacco history on file.    Past Medical History:  has a past medical history of CAD (coronary artery disease), Depression, HLD (hyperlipidemia), HTN (hypertension), Myocardial infarction, Osteoarthritis, Presence of stent in coronary artery, and Type 1 diabetes.    Current Medications:  Current Outpatient Medications   Medication Instructions    aspirin (ECOTRIN) 81 mg, Oral, Daily    atorvastatin (LIPITOR) 40 " "mg, Oral, Nightly    blood sugar diagnostic Strp 1 strip, Misc.(Non-Drug; Combo Route), 3 times daily    blood-glucose meter kit To check BG four times daily, to use with insurance preferred meter, TruMetrix if possible    cephALEXin (KEFLEX) 500 mg, Oral, 4 times daily    cetirizine (ZYRTEC) 10 MG tablet TAKE ONE TABLET BY MOUTH EVERY DAY    diclofenac sodium (VOLTAREN) 2 g, Topical    EASY TOUCH TWIST LANCETS 32 gauge Misc No dose, route, or frequency recorded.    ezetimibe (ZETIA) 10 mg, Oral    fluticasone propionate (FLONASE) 50 mcg/actuation nasal spray Nasal    insulin glargine (LANTUS) 36 Units, Subcutaneous, Daily    insulin lispro 100 unit/mL pen USE PER SLIDING (UP TO 50 UNITS PER DAY)    lisinopriL (PRINIVIL,ZESTRIL) 2.5 MG tablet TAKE ONE TABLET BY MOUTH EVERY DAY    metoprolol tartrate (LOPRESSOR) 12.5 mg, Oral, 2 times daily    montelukast (SINGULAIR) 10 mg tablet TAKE ONE TABLET BY MOUTH EVERY DAY    paroxetine (PAXIL) 30 MG tablet TAKE ONE TABLET BY MOUTH EVERY DAY    pen needle, diabetic (BD ULTRA-FINE DONNY PEN NEEDLE) 32 gauge x 5/32" Ndle 1 each, Subcutaneous, 4 times daily    prasugreL (EFFIENT) 10 mg, Oral    ticagrelor (BRILINTA) 90 mg, Oral, 2 times daily    TRUE METRIX GLUCOSE TEST STRIP Strp 1 strip, 4 times daily       Review of Systems   See HPI    Visit Vitals  /80 (BP Location: Left arm)   Pulse 78   Temp 97 °F (36.1 °C) (Temporal)   Ht 5' 2.21" (1.58 m)   Wt 67.7 kg (149 lb 3.2 oz)   SpO2 98%   BMI 27.11 kg/m²       Physical Exam  Vitals reviewed.   Constitutional:       Appearance: Normal appearance.   Cardiovascular:      Heart sounds: Normal heart sounds.   Pulmonary:      Breath sounds: Normal breath sounds.   Skin:     General: Skin is warm and dry.   Neurological:      Mental Status: She is oriented to person, place, and time.          Labs Reviewed:  Chemistry:  Lab Results   Component Value Date     07/05/2023    K 4.8 07/05/2023    CHLORIDE 104 07/05/2023    BUN " 7.0 07/05/2023    CREATININE 0.65 (L) 07/05/2023    EGFRNORACEVR >90 07/05/2023    GLUCOSE 216 (H) 07/05/2023    CALCIUM 9.4 07/05/2023    ALKPHOS 110 07/05/2023    LABPROT 6.6 07/05/2023    ALBUMIN 4.0 07/05/2023    AST 29 07/05/2023    ALT 26 07/05/2023        Lab Results   Component Value Date    HGBA1C 9.4 (H) 07/05/2023        Assessment & Plan:  1. Uncontrolled type 1 diabetes mellitus with hyperglycemia  Overview:  Diabetes labs:   Lab Results   Component Value Date    HGBA1C 9.4 (H) 07/05/2023      On Insulin Lispro at mealtimes; Lantus 20 units Qam and 16 units Qpm      Orders:  -     Ambulatory referral/consult to Endocrinology; Future; Expected date: 07/18/2023  -     Hemoglobin A1C; Future; Expected date: 10/11/2023  -     Comprehensive Metabolic Panel; Future; Expected date: 10/11/2023    Increase mealtime insulin to 6 units + sliding scale  Continue current dose Lantus  Take all medications as directed; compliance is critical for managing your diabetes.   Follow American Diabetic Association (ADA) dietary guidelines for eating and implement exercise into your daily regimen.   Check your glucose levels each morning and record for review at follow up.    Follow up in about 3 months (around 10/11/2023) for DM1, labs prior. Call sooner if needed.    Lisandra Flores, CATARINO-C

## 2023-07-18 ENCOUNTER — DOCUMENTATION ONLY (OUTPATIENT)
Dept: FAMILY MEDICINE | Facility: CLINIC | Age: 58
End: 2023-07-18
Payer: MEDICAID

## 2023-07-18 NOTE — PROGRESS NOTES
"I called [facility]. I first spoke with Carlota. She verified that the documents were received on 7/11/23; however, an order was not created. She transferred me to the intake department and spoke with Chantel. She said the process was started but information was missing on the order that was started. Once she updated the order, she stated she was "sending it to the insurance verification team." She was not able to give me a time line so I will call back in a week to check on it.     "

## 2023-07-26 ENCOUNTER — TELEPHONE (OUTPATIENT)
Dept: FAMILY MEDICINE | Facility: CLINIC | Age: 58
End: 2023-07-26
Payer: MEDICAID

## 2023-07-26 NOTE — TELEPHONE ENCOUNTER
I called [facility]. They have closed out the account d/t trying to contact pt 4 times and no one has answered nor returned their calls. I called and there was no answer. Recording states customer is not available at this time.

## 2023-07-26 NOTE — TELEPHONE ENCOUNTER
----- Message from Bianca Diaz LPN sent at 7/18/2023 11:46 AM CDT -----  Regarding: Dexcom  Call [facility] to check on the status of the order/insurance verification.  Phone [phone]

## 2023-09-27 DIAGNOSIS — Z12.4 CERVICAL CANCER SCREENING: Primary | ICD-10-CM

## 2023-10-06 PROCEDURE — 80053 COMPREHEN METABOLIC PANEL: CPT | Performed by: NURSE PRACTITIONER

## 2023-10-06 PROCEDURE — 83036 HEMOGLOBIN GLYCOSYLATED A1C: CPT | Performed by: NURSE PRACTITIONER

## 2023-10-12 ENCOUNTER — OFFICE VISIT (OUTPATIENT)
Dept: FAMILY MEDICINE | Facility: CLINIC | Age: 58
End: 2023-10-12
Payer: MEDICAID

## 2023-10-12 VITALS
HEART RATE: 65 BPM | SYSTOLIC BLOOD PRESSURE: 122 MMHG | WEIGHT: 152 LBS | TEMPERATURE: 97 F | OXYGEN SATURATION: 97 % | DIASTOLIC BLOOD PRESSURE: 70 MMHG | HEIGHT: 62 IN | BODY MASS INDEX: 27.97 KG/M2

## 2023-10-12 DIAGNOSIS — Z00.01 ANNUAL VISIT FOR GENERAL ADULT MEDICAL EXAMINATION WITH ABNORMAL FINDINGS: ICD-10-CM

## 2023-10-12 DIAGNOSIS — E10.65 UNCONTROLLED TYPE 1 DIABETES MELLITUS WITH HYPERGLYCEMIA: Primary | ICD-10-CM

## 2023-10-12 PROCEDURE — 3008F PR BODY MASS INDEX (BMI) DOCUMENTED: ICD-10-PCS | Mod: CPTII,,, | Performed by: NURSE PRACTITIONER

## 2023-10-12 PROCEDURE — 3052F HG A1C>EQUAL 8.0%<EQUAL 9.0%: CPT | Mod: CPTII,,, | Performed by: NURSE PRACTITIONER

## 2023-10-12 PROCEDURE — 99214 PR OFFICE/OUTPT VISIT, EST, LEVL IV, 30-39 MIN: ICD-10-PCS | Mod: ,,, | Performed by: NURSE PRACTITIONER

## 2023-10-12 PROCEDURE — 4010F ACE/ARB THERAPY RXD/TAKEN: CPT | Mod: CPTII,,, | Performed by: NURSE PRACTITIONER

## 2023-10-12 PROCEDURE — 3078F PR MOST RECENT DIASTOLIC BLOOD PRESSURE < 80 MM HG: ICD-10-PCS | Mod: CPTII,,, | Performed by: NURSE PRACTITIONER

## 2023-10-12 PROCEDURE — 3052F PR MOST RECENT HEMOGLOBIN A1C LEVEL 8.0 - < 9.0%: ICD-10-PCS | Mod: CPTII,,, | Performed by: NURSE PRACTITIONER

## 2023-10-12 PROCEDURE — 3078F DIAST BP <80 MM HG: CPT | Mod: CPTII,,, | Performed by: NURSE PRACTITIONER

## 2023-10-12 PROCEDURE — 1160F PR REVIEW ALL MEDS BY PRESCRIBER/CLIN PHARMACIST DOCUMENTED: ICD-10-PCS | Mod: CPTII,,, | Performed by: NURSE PRACTITIONER

## 2023-10-12 PROCEDURE — 99214 OFFICE O/P EST MOD 30 MIN: CPT | Mod: ,,, | Performed by: NURSE PRACTITIONER

## 2023-10-12 PROCEDURE — 1160F RVW MEDS BY RX/DR IN RCRD: CPT | Mod: CPTII,,, | Performed by: NURSE PRACTITIONER

## 2023-10-12 PROCEDURE — 1159F PR MEDICATION LIST DOCUMENTED IN MEDICAL RECORD: ICD-10-PCS | Mod: CPTII,,, | Performed by: NURSE PRACTITIONER

## 2023-10-12 PROCEDURE — 1159F MED LIST DOCD IN RCRD: CPT | Mod: CPTII,,, | Performed by: NURSE PRACTITIONER

## 2023-10-12 PROCEDURE — 3074F SYST BP LT 130 MM HG: CPT | Mod: CPTII,,, | Performed by: NURSE PRACTITIONER

## 2023-10-12 PROCEDURE — 3074F PR MOST RECENT SYSTOLIC BLOOD PRESSURE < 130 MM HG: ICD-10-PCS | Mod: CPTII,,, | Performed by: NURSE PRACTITIONER

## 2023-10-12 PROCEDURE — 3008F BODY MASS INDEX DOCD: CPT | Mod: CPTII,,, | Performed by: NURSE PRACTITIONER

## 2023-10-12 PROCEDURE — 4010F PR ACE/ARB THEARPY RXD/TAKEN: ICD-10-PCS | Mod: CPTII,,, | Performed by: NURSE PRACTITIONER

## 2023-10-12 RX ORDER — ASPIRIN 81 MG/1
81 TABLET ORAL DAILY
Qty: 90 TABLET | Refills: 3
Start: 2023-10-12 | End: 2024-10-11

## 2023-10-12 NOTE — PROGRESS NOTES
"Patient ID: Danitza Lerner  : 1965    Chief Complaint: Labs Results (3mth f/u labs)    Allergies: Patient has No Known Allergies.     History of Present Illness:  The patient is a 58 y.o. White female who presents to clinic for follow up on Labs Results (3mth f/u labs)     Here for f/u on diabetes.     Last A1c 9.4%, now 8.7%. We have had great difficulty controlling her diabetes. Has had a history of pretty extreme "lows" at times. Attempts to titrate insulin in the past was unsuccessful due to these extreme morning lows.     She did have freestyle Wale in the past but had trouble with getting reader to stay on her skin and failed to follow advice to apply Tegaderm to hold it in place. Since then she got approved for a Dexcom and feels that it has been helpful. She notices much improvement in her blood sugar levels since getting this monitoring device.    Have previously sent referral to Endocrinology but they are unable to attend out of town appointments and she will not travel on medical transport van by herself.     Social History:  reports that she has been smoking cigarettes. She has a 35.0 pack-year smoking history. She has been exposed to tobacco smoke. She does not have any smokeless tobacco history on file. She reports that she does not drink alcohol and does not use drugs.    Past Medical History:  has a past medical history of CAD (coronary artery disease), Depression, HLD (hyperlipidemia), HTN (hypertension), Myocardial infarction, Osteoarthritis, Presence of stent in coronary artery, and Type 1 diabetes.    Current Medications:  Current Outpatient Medications   Medication Instructions    aspirin (ECOTRIN) 81 mg, Oral, Daily    atorvastatin (LIPITOR) 40 mg, Oral, Nightly    blood sugar diagnostic Strp 1 strip, Misc.(Non-Drug; Combo Route), 3 times daily    blood-glucose meter kit To check BG four times daily, to use with insurance preferred meter, [employer] if possible    cephALEXin (KEFLEX) " "500 mg, Oral, 4 times daily    cetirizine (ZYRTEC) 10 MG tablet TAKE ONE TABLET BY MOUTH EVERY DAY    diclofenac sodium (VOLTAREN) 2 g, Topical    EASY TOUCH TWIST LANCETS 32 gauge Misc No dose, route, or frequency recorded.    ezetimibe (ZETIA) 10 mg, Oral    fluticasone propionate (FLONASE) 50 mcg/actuation nasal spray Nasal    insulin glargine (LANTUS) 36 Units, Subcutaneous, Daily    insulin lispro 100 unit/mL pen USE PER SLIDING (UP TO 50 UNITS PER DAY)    lisinopriL (PRINIVIL,ZESTRIL) 2.5 MG tablet TAKE ONE TABLET BY MOUTH EVERY DAY    metoprolol tartrate (LOPRESSOR) 12.5 mg, Oral, 2 times daily    montelukast (SINGULAIR) 10 mg tablet TAKE ONE TABLET BY MOUTH EVERY DAY    paroxetine (PAXIL) 30 MG tablet TAKE ONE TABLET BY MOUTH EVERY DAY    pen needle, diabetic (BD ULTRA-FINE DONNY PEN NEEDLE) 32 gauge x 5/32" Ndle 1 each, Subcutaneous, 4 times daily    prasugreL (EFFIENT) 10 mg, Oral    ticagrelor (BRILINTA) 90 mg, Oral, 2 times daily    TRUE METRIX GLUCOSE TEST STRIP Strp 1 strip, 4 times daily       Review of Systems   See HPI    Visit Vitals  /70 (BP Location: Left arm)   Pulse 65   Temp 97.2 °F (36.2 °C) (Temporal)   Ht 5' 2" (1.575 m)   Wt 68.9 kg (152 lb)   SpO2 97%   BMI 27.80 kg/m²       Physical Exam  Vitals reviewed.   Constitutional:       Appearance: Normal appearance.   Cardiovascular:      Heart sounds: Normal heart sounds.   Pulmonary:      Breath sounds: Normal breath sounds.   Skin:     General: Skin is warm and dry.   Neurological:      Mental Status: She is oriented to person, place, and time.          Labs Reviewed:  Chemistry:  Lab Results   Component Value Date     10/06/2023    K 4.9 10/06/2023    CHLORIDE 104 10/06/2023    BUN 11.0 10/06/2023    CREATININE 0.62 (L) 10/06/2023    EGFRNORACEVR >90 10/06/2023    GLUCOSE 157 (H) 10/06/2023    CALCIUM 9.5 10/06/2023    ALKPHOS 110 10/06/2023    LABPROT 7.5 10/06/2023    ALBUMIN 4.4 10/06/2023    AST 29 10/06/2023    ALT 26 " 10/06/2023        Lab Results   Component Value Date    HGBA1C 8.7 (H) 10/06/2023            Assessment & Plan:  1. Uncontrolled type 1 diabetes mellitus with hyperglycemia  Overview:  Diabetes labs:   Lab Results   Component Value Date    HGBA1C 9.4 (H) 07/05/2023      On Insulin Lispro at mealtimes; Lantus 20 units Qam and 16 units Qpm  Now has Dexcom     Assessment & Plan:  Diabetes labs:   Lab Results   Component Value Date    HGBA1C 8.7 (H) 10/06/2023      Patient has been adhering to the CGM regimen and diabetes treatment plan.    Advise that she set her Dexcom to run between  and follow ss guidelines. (Current high alarm set at 250)  Take all medications as directed; compliance is critical for managing your diabetes.     Follow American Diabetic Association (ADA) dietary guidelines for eating and implement exercise into your daily regimen.   Check your glucose levels each morning and record for review at follow up.           Future Appointments   Date Time Provider Department Center   10/18/2023  9:30 AM Carlita Costello MD [facility]       Follow up in about 6 months (around 4/12/2024) for wellness, labs prior; PLUS 3 month diabetes f/u with labs prior. Call sooner if needed.    TRENA Blackman    Lab Frequency Next Occurrence   Ambulatory referral/consult to Endocrinology Once 07/18/2023   Ambulatory referral/consult to Obstetrics / Gynecology Once 10/04/2023

## 2023-10-12 NOTE — ASSESSMENT & PLAN NOTE
Diabetes labs:   Lab Results   Component Value Date    HGBA1C 8.7 (H) 10/06/2023      Patient has been adhering to the CGM regimen and diabetes treatment plan.   Advise that she set her Dexcom to run between  and follow ss guidelines.

## 2023-10-18 ENCOUNTER — OFFICE VISIT (OUTPATIENT)
Dept: OBSTETRICS AND GYNECOLOGY | Facility: CLINIC | Age: 58
End: 2023-10-18
Payer: MEDICAID

## 2023-10-18 VITALS
SYSTOLIC BLOOD PRESSURE: 118 MMHG | DIASTOLIC BLOOD PRESSURE: 64 MMHG | WEIGHT: 153.88 LBS | TEMPERATURE: 97 F | HEIGHT: 62 IN | BODY MASS INDEX: 28.32 KG/M2

## 2023-10-18 DIAGNOSIS — Z12.4 CERVICAL CANCER SCREENING: ICD-10-CM

## 2023-10-18 DIAGNOSIS — Z12.31 BREAST CANCER SCREENING BY MAMMOGRAM: ICD-10-CM

## 2023-10-18 DIAGNOSIS — Z71.6 TOBACCO ABUSE COUNSELING: ICD-10-CM

## 2023-10-18 DIAGNOSIS — Z01.419 WELL WOMAN EXAM: Primary | ICD-10-CM

## 2023-10-18 PROCEDURE — 3008F BODY MASS INDEX DOCD: CPT | Mod: CPTII,,, | Performed by: OBSTETRICS & GYNECOLOGY

## 2023-10-18 PROCEDURE — 1159F PR MEDICATION LIST DOCUMENTED IN MEDICAL RECORD: ICD-10-PCS | Mod: CPTII,,, | Performed by: OBSTETRICS & GYNECOLOGY

## 2023-10-18 PROCEDURE — 99386 PR PREVENTIVE VISIT,NEW,40-64: ICD-10-PCS | Mod: ,,, | Performed by: OBSTETRICS & GYNECOLOGY

## 2023-10-18 PROCEDURE — 1159F MED LIST DOCD IN RCRD: CPT | Mod: CPTII,,, | Performed by: OBSTETRICS & GYNECOLOGY

## 2023-10-18 PROCEDURE — 3052F HG A1C>EQUAL 8.0%<EQUAL 9.0%: CPT | Mod: CPTII,,, | Performed by: OBSTETRICS & GYNECOLOGY

## 2023-10-18 PROCEDURE — 3052F PR MOST RECENT HEMOGLOBIN A1C LEVEL 8.0 - < 9.0%: ICD-10-PCS | Mod: CPTII,,, | Performed by: OBSTETRICS & GYNECOLOGY

## 2023-10-18 PROCEDURE — 3074F PR MOST RECENT SYSTOLIC BLOOD PRESSURE < 130 MM HG: ICD-10-PCS | Mod: CPTII,,, | Performed by: OBSTETRICS & GYNECOLOGY

## 2023-10-18 PROCEDURE — 4010F PR ACE/ARB THEARPY RXD/TAKEN: ICD-10-PCS | Mod: CPTII,,, | Performed by: OBSTETRICS & GYNECOLOGY

## 2023-10-18 PROCEDURE — 3078F DIAST BP <80 MM HG: CPT | Mod: CPTII,,, | Performed by: OBSTETRICS & GYNECOLOGY

## 2023-10-18 PROCEDURE — 99386 PREV VISIT NEW AGE 40-64: CPT | Mod: ,,, | Performed by: OBSTETRICS & GYNECOLOGY

## 2023-10-18 PROCEDURE — 4010F ACE/ARB THERAPY RXD/TAKEN: CPT | Mod: CPTII,,, | Performed by: OBSTETRICS & GYNECOLOGY

## 2023-10-18 PROCEDURE — 3074F SYST BP LT 130 MM HG: CPT | Mod: CPTII,,, | Performed by: OBSTETRICS & GYNECOLOGY

## 2023-10-18 PROCEDURE — 3078F PR MOST RECENT DIASTOLIC BLOOD PRESSURE < 80 MM HG: ICD-10-PCS | Mod: CPTII,,, | Performed by: OBSTETRICS & GYNECOLOGY

## 2023-10-18 PROCEDURE — 3008F PR BODY MASS INDEX (BMI) DOCUMENTED: ICD-10-PCS | Mod: CPTII,,, | Performed by: OBSTETRICS & GYNECOLOGY

## 2023-10-18 NOTE — PROGRESS NOTES
Chief Complaint:  Well Woman    History of Present Illness:  Danitaz Lerner is a 58 y.o. year old  presents for her well woman exam. Menopausal, she denies any postmenopausal bleeding. She is without complaints today.       MENOPAUSAL:  Age at menarche: 14  Age at menopause: 52  Hysterectomy:  No  Last pap: 2017 NIL per patient   H/o abnl pap: No   Postcoital Bleeding: No  Sexually Active: Not currenlty    Dyspareunia: No  H/o STI: No   HRT: None  MM Benign   H/o abnl MMG: No, h/o dense breast  Colonoscopy:  WNL per patient      Review of Systems:  General/Constitutional: Chills denies. Fatigue/weakness denies. Fever denies. Night sweats denies. Hot flashes denies    Respiratory: Cough denies. Hemoptysis denies. SOB denies. Sputum production denies. Wheezing denies .   Cardiovascular: Chest pain denies. Dizziness denies. Palpitations denies. Swelling in hands/feet denies    Gastrointestinal: Abdominal pain denies. Blood in stool denies. Constipation denies. Diarrhea denies. Heartburn denies. Nausea denies. Vomiting denies    Genitourinary: Incontinence denies. Blood in urine denies. Frequent urination denies. Painful urination denies. Urinary urgency denies. Nocturia denies    Gynecologic: Irregular menses denies. Heavy bleeding denies. Painful menses denies. Vaginal discharge denies. Vaginal odor denies. Vaginal itching denies. Vaginal lesion denies. Pelvic pain denies. Decreased libido denies. Vulvar lesion denies. Prolapse of genital organs denies. Painful intercourse denies. Postcoital bleeding denies    Psychiatric: Depression denies. Anxiety denies     OB History    Para Term  AB Living   2 2 2 0 0 1   SAB IAB Ectopic Multiple Live Births   0 0 0 0 2      # 1 - Date: [birth year], Sex: Female, Weight: 3.43 kg (7 lb 9 oz), GA: None, Delivery: , Unspecified, Apgar1: None, Apgar5: None, Living: Living, Birth Comments: None    # 2 - Date: [birth year], Sex: Female, Weight: 3.232  kg (7 lb 2 oz), GA: None, Delivery: , Unspecified, Apgar1: None, Apgar5: None, Living:  Demise, Birth Comments: None      Past Medical History:   Diagnosis Date    CAD (coronary artery disease)     Depression     HLD (hyperlipidemia)     HTN (hypertension)     Myocardial infarction     Osteoarthritis     Presence of stent in coronary artery     Type 1 diabetes     since 20 y/o         Current Outpatient Medications:     aspirin (ECOTRIN) 81 MG EC tablet, Take 1 tablet (81 mg total) by mouth once daily., Disp: 90 tablet, Rfl: 3    atorvastatin (LIPITOR) 40 MG tablet, Take 1 tablet (40 mg total) by mouth every evening., Disp: 90 tablet, Rfl: 3    blood sugar diagnostic Strp, 1 strip by Misc.(Non-Drug; Combo Route) route 3 (three) times daily., Disp: , Rfl:     blood-glucose meter kit, To check BG four times daily, to use with insurance preferred meter, [employer] if possible, Disp: 1 each, Rfl: 0    cetirizine (ZYRTEC) 10 MG tablet, TAKE ONE TABLET BY MOUTH EVERY DAY, Disp: 30 tablet, Rfl: 11    diclofenac sodium (VOLTAREN) 1 % Gel, Apply 2 g topically., Disp: , Rfl:     EASY TOUCH TWIST LANCETS 32 gauge Misc, , Disp: , Rfl:     ezetimibe (ZETIA) 10 mg tablet, Take 10 mg by mouth., Disp: , Rfl:     fluticasone propionate (FLONASE) 50 mcg/actuation nasal spray, by Nasal route., Disp: , Rfl:     insulin glargine (LANTUS) 100 unit/mL injection, Inject 36 Units into the skin once daily., Disp: 15 mL, Rfl: 11    insulin lispro 100 unit/mL pen, USE PER SLIDING (UP TO 50 UNITS PER DAY), Disp: 15 mL, Rfl: 11    lisinopriL (PRINIVIL,ZESTRIL) 2.5 MG tablet, TAKE ONE TABLET BY MOUTH EVERY DAY, Disp: 30 tablet, Rfl: 11    metoprolol tartrate (LOPRESSOR) 25 MG tablet, Take 0.5 tablets (12.5 mg total) by mouth 2 (two) times daily., Disp: 30 tablet, Rfl: 11    montelukast (SINGULAIR) 10 mg tablet, TAKE ONE TABLET BY MOUTH EVERY DAY, Disp: 30 tablet, Rfl: 11    paroxetine (PAXIL) 30 MG tablet, TAKE ONE TABLET BY MOUTH  "EVERY DAY, Disp: 30 tablet, Rfl: 11    pen needle, diabetic (BD ULTRA-FINE DONNY PEN NEEDLE) 32 gauge x 5/32" Ndle, Inject 1 each into the skin 4 (four) times daily., Disp: 200 each, Rfl: 11    ticagrelor (BRILINTA) 90 mg tablet, Take 1 tablet (90 mg total) by mouth 2 (two) times a day., Disp: 60 tablet, Rfl: 11    TRUE METRIX GLUCOSE TEST STRIP Strp, 1 strip 4 (four) times daily., Disp: , Rfl:     cephALEXin (KEFLEX) 500 MG capsule, Take 500 mg by mouth 4 (four) times daily., Disp: , Rfl:     Review of patient's allergies indicates:  No Known Allergies    Past Surgical History:   Procedure Laterality Date    ANGIOGRAM, CORONARY, WITH LEFT HEART CATHETERIZATION N/A 2022    Procedure: Angiogram, Coronary, with Left Heart Cath;  Surgeon: Hayley Chiang MD;  Location: [facility] CATH LAB;  Service: Cardiology;  Laterality: N/A;     SECTION       SECTION      CHOLECYSTECTOMY  2021    COLONOSCOPY      PERCUTANEOUS CORONARY INTERVENTION (PCI) FOR CHRONIC TOTAL OCCLUSION OF CORONARY ARTERY      w/ stents    TUBAL LIGATION         Family History   Problem Relation Age of Onset    Alzheimer's disease Mother     Dementia Mother     Diabetes type I Mother     Heart disease Mother     Heart failure Father     Hepatitis Father         Hepatitis C    Tuberculosis Father     Breast cancer Maternal Aunt         unsure of age dx    Breast cancer Other 33    Uterine cancer Neg Hx     Ovarian cancer Neg Hx     Colon cancer Neg Hx        Social History     Socioeconomic History    Marital status:    Tobacco Use    Smoking status: Every Day     Current packs/day: 1.00     Average packs/day: 1 pack/day for 35.0 years (35.0 ttl pk-yrs)     Types: Cigarettes     Passive exposure: Current   Substance and Sexual Activity    Alcohol use: Never    Drug use: Never    Sexual activity: Not Currently       Physical Exam:  /64   Temp 97.2 °F (36.2 °C)   Ht 5' 2" (1.575 m)   Wt 69.8 kg (153 lb " 14.1 oz)   BMI 28.15 kg/m²     Chaperone: present.       General appearance: healthy, well-nourished and well-developed     Psychiatric: Orientation to time, place and person. Normal mood and affect and active, alert     Skin: Appearance: no rashes or lesions.     Neck:   Neck: supple, FROM, trachea midline. and no masses   Thyroid: no enlargement or nodules and non-tender.       Cardiovascular:   Auscultation: RRR and no murmur.   Peripheral Vascular: no varicosities, LLE edema, RLE edema, calf tenderness, and palpable cord and pedal pulses intact.     Lungs:   Respiratory effort: no intercostal retractions or accessory muscle usage.   Auscultation: no wheezing, rales/crackles, or rhonchi and clear to auscultation.     Breast:   Inspection/Palpation: no tenderness, discrete/distinct masses, skin changes, or abnormal secretions. Nipple appearance normal.     Abdomen:   Auscultation/Inspection/Palpation: no hepatomegaly, splenomegaly, masses, tenderness or CVA tenderness and soft, non-distended bowel sounds preset.    Hernia: no palpable hernias.     Female Genitalia:    Vulva: no masses, tenderness or lesions    Bladder/Urethra: no urethral discharge or mass, normal meatus, bladder non-distended.    Vagina: no tenderness, erythema, cystocele, rectocele, abnormal vaginal discharge or vesicle(s) or ulcers    Cervix: no discharge, no cervical lacerations noted or motion tenderness and grossly normal    Uterus: normal size and shape and midline, non-tender, and no uterine prolapse.    Adnexa/Parametria: no parametrial tenderness or mass, no adnexal tenderness or ovarian mass.     Lymph Nodes:   Palpation: non tender submandibular nodes, axillary nodes, or inguinal nodes.     Rectal Exam:   Rectum: normal perianal skin.       Assessment/Plan:  1. Well woman exam  Pap   Advised patient if she notices any changes to her breasts, including a lump, mass, dimpling, discharge, rash or tenderness, to should contact us  immediately  Healthy diet, exercise   MMG  Multivitamin   Seat belt   Sunscreen use   Safe sex/ STI education  Annual labs: w/ PCP, CATARINO Brown  C-scope: 2021 neg per pt.     2. Tobacco abuse counseling  Discussed harmful effects of tobacco and addictive characteristics     Advised cessation of use     Discussed with patient Chantix, bupropion, patch, or gum   Patient states understanding

## 2023-10-23 LAB — PSYCHE PATHOLOGY RESULT: NORMAL

## 2023-11-17 ENCOUNTER — TELEPHONE (OUTPATIENT)
Dept: FAMILY MEDICINE | Facility: CLINIC | Age: 58
End: 2023-11-17
Payer: MEDICAID

## 2023-11-17 NOTE — TELEPHONE ENCOUNTER
She said that she has not received anything since her visit in October. I called [facility]. No one answered so I left a voicemail for someone to call back. I called Danitza back and let her know. She said that she had spoken with a Prabha previously. I told her that I would call her and then get back with her. I called Prabha [place]. She said that she was not assigned Danitza's file and didn't see the note that was faxed on 10/12. She asked if I could fax it directly to her and then she would call Danitza. I faxed them and notified Danitza of my conversation with Prabha and told her to call us back if she doesn't get her samples within a week or so. She verbalized understanding.

## 2023-12-20 ENCOUNTER — TELEPHONE (OUTPATIENT)
Dept: FAMILY MEDICINE | Facility: CLINIC | Age: 58
End: 2023-12-20
Payer: MEDICAID

## 2023-12-20 NOTE — TELEPHONE ENCOUNTER
According to patient's fill history, she has not filled her prescription of atorvastatin since 10/20/23. I called Dr. Noriega's office and notified Cayla. She sent a message to the nurse. According to Dr. Noriega's visit note, she is to continue taking it.

## 2023-12-27 ENCOUNTER — TELEPHONE (OUTPATIENT)
Dept: ADMINISTRATIVE | Facility: HOSPITAL | Age: 58
End: 2023-12-27
Payer: MEDICAID

## 2023-12-27 ENCOUNTER — TELEPHONE (OUTPATIENT)
Dept: FAMILY MEDICINE | Facility: CLINIC | Age: 58
End: 2023-12-27
Payer: MEDICAID

## 2023-12-27 RX ORDER — ATORVASTATIN CALCIUM 40 MG/1
40 TABLET, FILM COATED ORAL DAILY
COMMUNITY

## 2024-01-11 PROCEDURE — 83036 HEMOGLOBIN GLYCOSYLATED A1C: CPT | Performed by: NURSE PRACTITIONER

## 2024-01-11 PROCEDURE — 80053 COMPREHEN METABOLIC PANEL: CPT | Performed by: NURSE PRACTITIONER

## 2024-02-27 ENCOUNTER — TELEPHONE (OUTPATIENT)
Dept: FAMILY MEDICINE | Facility: CLINIC | Age: 59
End: 2024-02-27
Payer: MEDICAID

## 2024-02-27 NOTE — TELEPHONE ENCOUNTER
I called and explained that we do not have samples of any of her medicine. I also instructed her to call Dr. Noriega's office about the Brillenta as it is over $100. She verbalized understanding.

## 2024-03-15 ENCOUNTER — TELEPHONE (OUTPATIENT)
Dept: FAMILY MEDICINE | Facility: CLINIC | Age: 59
End: 2024-03-15
Payer: MEDICAID

## 2024-03-27 DIAGNOSIS — J30.2 SEASONAL ALLERGIES: ICD-10-CM

## 2024-03-27 RX ORDER — CETIRIZINE HYDROCHLORIDE 10 MG/1
TABLET ORAL
Qty: 30 TABLET | Refills: 11 | Status: SHIPPED | OUTPATIENT
Start: 2024-03-27

## 2024-04-08 ENCOUNTER — HOSPITAL ENCOUNTER (EMERGENCY)
Facility: HOSPITAL | Age: 59
Discharge: HOME | End: 2024-04-08
Payer: MEDICAID

## 2024-04-08 VITALS
WEIGHT: 158 LBS | RESPIRATION RATE: 20 BRPM | SYSTOLIC BLOOD PRESSURE: 132 MMHG | OXYGEN SATURATION: 99 % | HEART RATE: 80 BPM | BODY MASS INDEX: 29.08 KG/M2 | HEIGHT: 62 IN | DIASTOLIC BLOOD PRESSURE: 70 MMHG | TEMPERATURE: 101 F

## 2024-04-08 DIAGNOSIS — J02.9 PHARYNGITIS, UNSPECIFIED ETIOLOGY: ICD-10-CM

## 2024-04-08 DIAGNOSIS — I10 HYPERTENSION, UNSPECIFIED TYPE: ICD-10-CM

## 2024-04-08 DIAGNOSIS — F32.9 MAJOR DEPRESSIVE DISORDER, REMISSION STATUS UNSPECIFIED, UNSPECIFIED WHETHER RECURRENT: ICD-10-CM

## 2024-04-08 DIAGNOSIS — H66.91 RIGHT OTITIS MEDIA, UNSPECIFIED OTITIS MEDIA TYPE: Primary | ICD-10-CM

## 2024-04-08 LAB
INFLUENZA A (OHS): NEGATIVE
INFLUENZA B (OHS): NEGATIVE
RAPID GROUP A STREP (OHS): NEGATIVE
SARS-COV-2 RDRP RESP QL NAA+PROBE: NEGATIVE

## 2024-04-08 PROCEDURE — 25000003 PHARM REV CODE 250: Performed by: NURSE PRACTITIONER

## 2024-04-08 PROCEDURE — 99283 EMERGENCY DEPT VISIT LOW MDM: CPT

## 2024-04-08 PROCEDURE — 87635 SARS-COV-2 COVID-19 AMP PRB: CPT | Performed by: NURSE PRACTITIONER

## 2024-04-08 PROCEDURE — 87651 STREP A DNA AMP PROBE: CPT | Performed by: NURSE PRACTITIONER

## 2024-04-08 PROCEDURE — 87400 INFLUENZA A/B EACH AG IA: CPT | Performed by: NURSE PRACTITIONER

## 2024-04-08 RX ORDER — IBUPROFEN 400 MG/1
400 TABLET ORAL
Status: COMPLETED | OUTPATIENT
Start: 2024-04-08 | End: 2024-04-08

## 2024-04-08 RX ORDER — PAROXETINE 30 MG/1
30 TABLET, FILM COATED ORAL
Qty: 30 TABLET | Refills: 0 | Status: SHIPPED | OUTPATIENT
Start: 2024-04-08 | End: 2024-05-06

## 2024-04-08 RX ORDER — AMOXICILLIN AND CLAVULANATE POTASSIUM 875; 125 MG/1; MG/1
1 TABLET, FILM COATED ORAL 2 TIMES DAILY
Qty: 14 TABLET | Refills: 0 | Status: SHIPPED | OUTPATIENT
Start: 2024-04-08 | End: 2024-06-14

## 2024-04-08 RX ORDER — LISINOPRIL 2.5 MG/1
TABLET ORAL
Qty: 30 TABLET | Refills: 0 | Status: SHIPPED | OUTPATIENT
Start: 2024-04-08 | End: 2024-05-06

## 2024-04-08 RX ADMIN — IBUPROFEN 400 MG: 400 TABLET, FILM COATED ORAL at 08:04

## 2024-04-08 NOTE — ED PROVIDER NOTES
"Encounter Date: 2024       History     Chief Complaint   Patient presents with    Otalgia    Sore Throat     Pt presented to ED per POV with c/o right ear pain and sore throat. Pt states "I can't swallow anything". No distress noted in triage.      58 year old female presents with sore throat and right ear pain x 2 days.  States she can't swallow anything.        Review of patient's allergies indicates:  No Known Allergies  Past Medical History:   Diagnosis Date    CAD (coronary artery disease)     Depression     HLD (hyperlipidemia)     HTN (hypertension)     Myocardial infarction     Osteoarthritis     Presence of stent in coronary artery     Type 1 diabetes     since 20 y/o     Past Surgical History:   Procedure Laterality Date    ANGIOGRAM, CORONARY, WITH LEFT HEART CATHETERIZATION N/A 2022    Procedure: Angiogram, Coronary, with Left Heart Cath;  Surgeon: Hayley Chiang MD;  Location: [facility] CATH LAB;  Service: Cardiology;  Laterality: N/A;     SECTION       SECTION      CHOLECYSTECTOMY  2021    COLONOSCOPY      PERCUTANEOUS CORONARY INTERVENTION (PCI) FOR CHRONIC TOTAL OCCLUSION OF CORONARY ARTERY      w/ stents    TUBAL LIGATION       Family History   Problem Relation Age of Onset    Alzheimer's disease Mother     Dementia Mother     Diabetes type I Mother     Heart disease Mother     Heart failure Father     Hepatitis Father         Hepatitis C    Tuberculosis Father     Breast cancer Maternal Aunt         unsure of age dx    Breast cancer Other 33    Uterine cancer Neg Hx     Ovarian cancer Neg Hx     Colon cancer Neg Hx      Social History     Tobacco Use    Smoking status: Every Day     Current packs/day: 1.00     Average packs/day: 1 pack/day for 35.0 years (35.0 ttl pk-yrs)     Types: Cigarettes     Passive exposure: Current   Substance Use Topics    Alcohol use: Never    Drug use: Never     Review of Systems   HENT:  Positive for ear pain and sore throat.  "   All other systems reviewed and are negative.      Physical Exam     Initial Vitals [04/08/24 1817]   BP Pulse Resp Temp SpO2   132/70 80 20 (!) 101.2 °F (38.4 °C) 99 %      MAP       --         Physical Exam    Nursing note and vitals reviewed.  Constitutional: She appears well-developed and well-nourished.   HENT:   Head: Normocephalic and atraumatic.   Right Ear: Hearing, external ear and ear canal normal. Tympanic membrane is injected and bulging.   Left Ear: Hearing, tympanic membrane, external ear and ear canal normal.   Mouth/Throat: Uvula is midline and mucous membranes are normal. Posterior oropharyngeal edema and posterior oropharyngeal erythema present.   Eyes: Conjunctivae and EOM are normal. Pupils are equal, round, and reactive to light.   Neck: Neck supple.   Normal range of motion.  Cardiovascular:  Normal rate, regular rhythm, normal heart sounds and intact distal pulses.           Pulmonary/Chest: Breath sounds normal.   Abdominal: Abdomen is soft. Bowel sounds are normal.   Musculoskeletal:         General: Normal range of motion.      Cervical back: Normal range of motion and neck supple.     Neurological: She is alert and oriented to person, place, and time. She has normal strength.   Skin: Skin is warm and dry. Capillary refill takes less than 2 seconds.   Psychiatric: She has a normal mood and affect. Her behavior is normal. Judgment and thought content normal.         ED Course   Procedures  Labs Reviewed   THROAT SCREEN, RAPID STREP - Normal   RAPID INFLUENZA A/B - Normal   SARS-COV-2 RNA AMPLIFICATION, QUAL - Normal          Imaging Results    None          Medications   ibuprofen tablet 400 mg (400 mg Oral Given 4/8/24 2049)     Medical Decision Making  58 year old female with sore throat and ear pain, febrile.  Covid (-) Flu (-) Strep(-), discharge home with oral abx.    Amount and/or Complexity of Data Reviewed  Labs: ordered. Decision-making details documented in ED  Course.    Risk  OTC drugs.  Prescription drug management.                                      Clinical Impression:  Final diagnoses:  [H66.91] Right otitis media, unspecified otitis media type (Primary)  [J02.9] Pharyngitis, unspecified etiology          ED Disposition Condition    Discharge Stable          ED Prescriptions       Medication Sig Dispense Start Date End Date Auth. Provider    amoxicillin-clavulanate 875-125mg (AUGMENTIN) 875-125 mg per tablet Take 1 tablet by mouth 2 (two) times daily. 14 tablet 4/8/2024 -- Sabrina Win FNP          Follow-up Information       Follow up With Specialties Details Why Contact Info    Lisandra Flores FNP-C Family Medicine Call in 3 days If symptoms worsen [address]   [address]  [facility] [address]  [phone]               Sabrina Win FNP  04/08/24 6572

## 2024-05-06 DIAGNOSIS — F32.9 MAJOR DEPRESSIVE DISORDER, REMISSION STATUS UNSPECIFIED, UNSPECIFIED WHETHER RECURRENT: ICD-10-CM

## 2024-05-06 DIAGNOSIS — I10 HYPERTENSION, UNSPECIFIED TYPE: ICD-10-CM

## 2024-05-06 RX ORDER — PAROXETINE 30 MG/1
30 TABLET, FILM COATED ORAL
Qty: 30 TABLET | Refills: 5 | Status: SHIPPED | OUTPATIENT
Start: 2024-05-06

## 2024-05-06 RX ORDER — LISINOPRIL 2.5 MG/1
2.5 TABLET ORAL
Qty: 30 TABLET | Refills: 5 | Status: SHIPPED | OUTPATIENT
Start: 2024-05-06

## 2024-05-13 DIAGNOSIS — E11.9 TYPE 2 DIABETES MELLITUS WITHOUT COMPLICATION, UNSPECIFIED WHETHER LONG TERM INSULIN USE: ICD-10-CM

## 2024-05-13 RX ORDER — INSULIN LISPRO 100 [IU]/ML
INJECTION, SOLUTION INTRAVENOUS; SUBCUTANEOUS
Qty: 15 ML | Refills: 11 | Status: SHIPPED | OUTPATIENT
Start: 2024-05-13

## 2024-06-03 DIAGNOSIS — E78.5 HYPERLIPIDEMIA, UNSPECIFIED HYPERLIPIDEMIA TYPE: Primary | ICD-10-CM

## 2024-06-03 DIAGNOSIS — E10.65 UNCONTROLLED TYPE 1 DIABETES MELLITUS WITH HYPERGLYCEMIA: ICD-10-CM

## 2024-06-03 RX ORDER — EZETIMIBE 10 MG/1
10 TABLET ORAL DAILY
Qty: 30 TABLET | Refills: 11 | Status: SHIPPED | OUTPATIENT
Start: 2024-06-03

## 2024-06-03 RX ORDER — INSULIN GLARGINE 100 [IU]/ML
36 INJECTION, SOLUTION SUBCUTANEOUS
Qty: 15 ML | Refills: 11 | Status: SHIPPED | OUTPATIENT
Start: 2024-06-03

## 2024-06-07 PROCEDURE — 84443 ASSAY THYROID STIM HORMONE: CPT | Performed by: NURSE PRACTITIONER

## 2024-06-07 PROCEDURE — 85025 COMPLETE CBC W/AUTO DIFF WBC: CPT | Performed by: NURSE PRACTITIONER

## 2024-06-07 PROCEDURE — 83036 HEMOGLOBIN GLYCOSYLATED A1C: CPT | Performed by: NURSE PRACTITIONER

## 2024-06-07 PROCEDURE — 80053 COMPREHEN METABOLIC PANEL: CPT | Performed by: NURSE PRACTITIONER

## 2024-06-07 PROCEDURE — 80061 LIPID PANEL: CPT | Performed by: NURSE PRACTITIONER

## 2024-06-14 ENCOUNTER — OFFICE VISIT (OUTPATIENT)
Dept: FAMILY MEDICINE | Facility: CLINIC | Age: 59
End: 2024-06-14
Payer: MEDICAID

## 2024-06-14 VITALS
HEART RATE: 80 BPM | WEIGHT: 147 LBS | OXYGEN SATURATION: 95 % | TEMPERATURE: 98 F | BODY MASS INDEX: 27.05 KG/M2 | DIASTOLIC BLOOD PRESSURE: 80 MMHG | SYSTOLIC BLOOD PRESSURE: 118 MMHG | HEIGHT: 62 IN

## 2024-06-14 DIAGNOSIS — I10 PRIMARY HYPERTENSION: ICD-10-CM

## 2024-06-14 DIAGNOSIS — R79.89 ELEVATED TSH: ICD-10-CM

## 2024-06-14 DIAGNOSIS — Z71.6 ENCOUNTER FOR TOBACCO USE CESSATION COUNSELING: ICD-10-CM

## 2024-06-14 DIAGNOSIS — I25.10 ARTERIOSCLEROSIS OF CORONARY ARTERY: ICD-10-CM

## 2024-06-14 DIAGNOSIS — E78.2 MIXED HYPERLIPIDEMIA: ICD-10-CM

## 2024-06-14 DIAGNOSIS — E10.65 UNCONTROLLED TYPE 1 DIABETES MELLITUS WITH HYPERGLYCEMIA: ICD-10-CM

## 2024-06-14 DIAGNOSIS — F33.42 RECURRENT MAJOR DEPRESSIVE DISORDER, IN FULL REMISSION: ICD-10-CM

## 2024-06-14 DIAGNOSIS — Z72.0 TOBACCO USER: ICD-10-CM

## 2024-06-14 DIAGNOSIS — F17.200 NEEDS SMOKING CESSATION EDUCATION: ICD-10-CM

## 2024-06-14 DIAGNOSIS — Z95.5 S/P RIGHT CORONARY ARTERY (RCA) STENT PLACEMENT: ICD-10-CM

## 2024-06-14 DIAGNOSIS — M15.9 PRIMARY OSTEOARTHRITIS INVOLVING MULTIPLE JOINTS: ICD-10-CM

## 2024-06-14 DIAGNOSIS — Z00.01 ANNUAL VISIT FOR GENERAL ADULT MEDICAL EXAMINATION WITH ABNORMAL FINDINGS: Primary | ICD-10-CM

## 2024-06-14 PROBLEM — J02.9 PHARYNGITIS: Status: RESOLVED | Noted: 2024-04-08 | Resolved: 2024-06-14

## 2024-06-14 PROBLEM — E10.9 BRITTLE DIABETES MELLITUS: Status: RESOLVED | Noted: 2023-02-15 | Resolved: 2024-06-14

## 2024-06-14 PROBLEM — H66.91 RIGHT OTITIS MEDIA: Status: RESOLVED | Noted: 2024-04-08 | Resolved: 2024-06-14

## 2024-06-14 PROBLEM — F17.210 CIGARETTE SMOKER: Status: RESOLVED | Noted: 2022-08-27 | Resolved: 2024-06-14

## 2024-06-14 LAB
CREAT UR-MCNC: 186.2 MG/DL (ref 45–106)
MICROALBUMIN UR-MCNC: 28.8 UG/ML
MICROALBUMIN/CREAT RATIO PNL UR: 15.5 MG/GM CR (ref 0–30)
T4 FREE SERPL-MCNC: 0.88 NG/DL (ref 0.78–2.19)
TSH SERPL-ACNC: 4.37 UIU/ML (ref 0.36–3.74)

## 2024-06-14 PROCEDURE — 99396 PREV VISIT EST AGE 40-64: CPT | Mod: ,,, | Performed by: NURSE PRACTITIONER

## 2024-06-14 PROCEDURE — 3046F HEMOGLOBIN A1C LEVEL >9.0%: CPT | Mod: CPTII,,, | Performed by: NURSE PRACTITIONER

## 2024-06-14 PROCEDURE — 82570 ASSAY OF URINE CREATININE: CPT | Performed by: NURSE PRACTITIONER

## 2024-06-14 PROCEDURE — 3074F SYST BP LT 130 MM HG: CPT | Mod: CPTII,,, | Performed by: NURSE PRACTITIONER

## 2024-06-14 PROCEDURE — 84439 ASSAY OF FREE THYROXINE: CPT | Performed by: NURSE PRACTITIONER

## 2024-06-14 PROCEDURE — 84443 ASSAY THYROID STIM HORMONE: CPT | Performed by: NURSE PRACTITIONER

## 2024-06-14 PROCEDURE — 3079F DIAST BP 80-89 MM HG: CPT | Mod: CPTII,,, | Performed by: NURSE PRACTITIONER

## 2024-06-14 PROCEDURE — 82043 UR ALBUMIN QUANTITATIVE: CPT | Performed by: NURSE PRACTITIONER

## 2024-06-14 PROCEDURE — 4010F ACE/ARB THERAPY RXD/TAKEN: CPT | Mod: CPTII,,, | Performed by: NURSE PRACTITIONER

## 2024-06-14 PROCEDURE — 1160F RVW MEDS BY RX/DR IN RCRD: CPT | Mod: CPTII,,, | Performed by: NURSE PRACTITIONER

## 2024-06-14 PROCEDURE — 1159F MED LIST DOCD IN RCRD: CPT | Mod: CPTII,,, | Performed by: NURSE PRACTITIONER

## 2024-06-14 PROCEDURE — 3008F BODY MASS INDEX DOCD: CPT | Mod: CPTII,,, | Performed by: NURSE PRACTITIONER

## 2024-06-14 RX ORDER — METOPROLOL TARTRATE 25 MG/1
12.5 TABLET, FILM COATED ORAL 2 TIMES DAILY
Qty: 30 TABLET | Refills: 11 | Status: SHIPPED | OUTPATIENT
Start: 2024-06-14 | End: 2025-06-14

## 2024-06-14 RX ORDER — TICAGRELOR 60 MG/1
60 TABLET ORAL 2 TIMES DAILY
COMMUNITY
Start: 2024-06-06

## 2024-06-14 RX ORDER — ATORVASTATIN CALCIUM 80 MG/1
80 TABLET, FILM COATED ORAL DAILY
Qty: 30 TABLET | Refills: 11 | Status: SHIPPED | OUTPATIENT
Start: 2024-06-14

## 2024-06-14 RX ORDER — IBUPROFEN 200 MG
TABLET ORAL
Qty: 75 PATCH | Refills: 0 | Status: SHIPPED | OUTPATIENT
Start: 2024-06-14

## 2024-06-14 RX ORDER — BUPROPION HYDROCHLORIDE 150 MG/1
TABLET, EXTENDED RELEASE ORAL
Qty: 60 TABLET | Refills: 2 | Status: SHIPPED | OUTPATIENT
Start: 2024-06-14

## 2024-06-14 NOTE — ASSESSMENT & PLAN NOTE
Continue current medications.     Need to check CBG QID to follow/administer sliding scale insulin in order to get better control of her glucose.  Would benefit from a continuous glucose monitor.     Take all medications as directed; compliance is critical for managing your diabetes.   Follow American Diabetic Association (ADA) dietary guidelines for eating and implement exercise into your daily regimen.   Check your glucose levels each morning and record for review at follow up.

## 2024-06-14 NOTE — PROGRESS NOTES
Patient ID: Danitza Lerner  : 1965    Chief Complaint: Annual Exam    Allergies: Patient has No Known Allergies.     History of Present Illness:  The patient is a 58 y.o. White female who presents to clinic for annual wellness visit.      She is a insulin dependent diabetic; has never been well controlled. Had a Dexcom at one time but then lost insurance. She needs to check her CBG QID as she is supposed to follow a sliding scale. A1c is 9.5% currently. She also takes 20 units Lantus in the evening and 16 units Qam. Her blood sugar levels vary widely from as low as 45 to as high as 280.  On numerous occasions her dogs have awakened her in the morning, when her  checks her sugar it is below 50 and he gives her sugar to correct this.     She has diabetic retinopathy and had surgery last year; she has follow up with them in Sept.     She still smokes about 1 ppd and is expressing a willingness to quit at this time.   She has CAD. She has been on Lipitor and Zetia and states compliance. Her LDL is not at goal.     Past Medical History:  has a past medical history of CAD (coronary artery disease), Depression, HLD (hyperlipidemia), HTN (hypertension), Myocardial infarction, Osteoarthritis, Presence of stent in coronary artery, and Type 1 diabetes.    Surgical History:  has a past surgical history that includes Percutaneous coronary intervention (PCI) for chronic total occlusion of coronary artery; ANGIOGRAM, CORONARY, WITH LEFT HEART CATHETERIZATION (N/A, 2022); Colonoscopy (); Cholecystectomy (2021);  section (); Tubal ligation (); and  section ().    Family History: family history includes Alzheimer's disease in her mother; Breast cancer in her maternal aunt; Breast cancer (age of onset: 33) in an other family member; Dementia in her mother; Diabetes type I in her mother; Heart disease in her mother; Heart failure in her father; Hepatitis in her father;  Tuberculosis in her father.    Social History:  reports that she has been smoking cigarettes. She started smoking about 40 years ago. She has a 40.5 pack-year smoking history. She has been exposed to tobacco smoke. She does not have any smokeless tobacco history on file. She reports that she does not currently use drugs. She reports that she does not drink alcohol.    Care Team: Patient Care Team:  Lisandra Flores FNP-C as PCP - General (Family Medicine)  Carlita Costello MD as Consulting Physician (Obstetrics and Gynecology)  Braulio Noriega MD (Inactive) (Internal Medicine)     Current Medications:  Current Outpatient Medications   Medication Instructions    aspirin (ECOTRIN) 81 mg, Oral, Daily    atorvastatin (LIPITOR) 80 mg, Oral, Daily    blood sugar diagnostic Strp 1 strip, Misc.(Non-Drug; Combo Route), 3 times daily    blood-glucose meter kit To check BG four times daily, to use with insurance preferred meter, TruMetrix if possible    BRILINTA 60 mg, Oral, 2 times daily    buPROPion (WELLBUTRIN SR) 150 MG TBSR 12 hr tablet Take 1 tablet PO daily x 3 days and then increase to 1 tab PO BID thereafter. Complete therapy in 3 months.    cetirizine (ZYRTEC) 10 MG tablet TAKE ONE TABLET BY MOUTH EVERY DAY    diclofenac sodium (VOLTAREN) 2 g, Topical    EASY TOUCH TWIST LANCETS 32 gauge Misc No dose, route, or frequency recorded.    ezetimibe (ZETIA) 10 mg, Oral, Daily    fluticasone propionate (FLONASE) 50 mcg/actuation nasal spray Nasal    HUMALOG KWIKPEN INSULIN 100 unit/mL pen USE PER SLIDING (UP TO 50 UNITS PER DAY)    LANTUS SOLOSTAR U-100 INSULIN 36 Units, Subcutaneous    lisinopriL (PRINIVIL,ZESTRIL) 2.5 mg, Oral    metoprolol tartrate (LOPRESSOR) 12.5 mg, Oral, 2 times daily    montelukast (SINGULAIR) 10 mg tablet TAKE ONE TABLET BY MOUTH EVERY DAY    nicotine (NICODERM CQ) 21 mg/24 hr 21 mg patch daily x 6 weeks, then 14 mg patch daily x 2 week, then 7 mg patch daily x 2 weeks; QUIT smoking on Day 1 of  "patches    paroxetine (PAXIL) 30 mg, Oral    pen needle, diabetic (BD ULTRA-FINE DONNY PEN NEEDLE) 32 gauge x 5/32" Ndle 1 each, Subcutaneous, 4 times daily    TRUE METRIX GLUCOSE TEST STRIP Strp 1 strip, 4 times daily       Review of Systems   Constitutional:  Negative for activity change, appetite change, fatigue and unexpected weight change.   HENT:  Negative for ear pain and hearing loss.    Eyes:  Negative for visual disturbance.   Respiratory:  Negative for apnea, cough, chest tightness, shortness of breath and wheezing.    Cardiovascular:  Negative for chest pain, palpitations, leg swelling and claudication.   Gastrointestinal:  Negative for abdominal pain, anal bleeding, blood in stool, change in bowel habit, nausea, vomiting and reflux.   Endocrine: Negative for cold intolerance, heat intolerance, polydipsia, polyphagia and polyuria.   Genitourinary:  Negative for dysuria, frequency, hematuria and urgency.   Musculoskeletal:  Negative for arthralgias.   Integumentary:  Negative for rash and mole/lesion.   Allergic/Immunologic: Negative for environmental allergies.   Neurological:  Negative for dizziness, headaches and memory loss.        Visit Vitals  /80 (BP Location: Left arm)   Pulse 80   Temp 98.1 °F (36.7 °C) (Temporal)   Ht 5' 2" (1.575 m)   Wt 66.7 kg (147 lb)   SpO2 95%   BMI 26.89 kg/m²       Physical Exam  Vitals reviewed.   Constitutional:       Appearance: Normal appearance. She is normal weight.   Eyes:      Conjunctiva/sclera: Conjunctivae normal.   Cardiovascular:      Rate and Rhythm: Normal rate and regular rhythm.      Pulses: Normal pulses.      Heart sounds: Normal heart sounds.   Pulmonary:      Effort: Pulmonary effort is normal.      Breath sounds: Normal breath sounds.   Abdominal:      General: Bowel sounds are normal.      Palpations: Abdomen is soft.   Musculoskeletal:      Cervical back: Neck supple.   Skin:     General: Skin is warm and dry.      Capillary Refill: Capillary " refill takes less than 2 seconds.   Neurological:      Mental Status: She is alert and oriented to person, place, and time.   Psychiatric:         Mood and Affect: Mood normal.         Behavior: Behavior normal.          Labs Reviewed:  Chemistry:  Lab Results   Component Value Date     06/07/2024    K 4.5 06/07/2024    BUN 14 06/07/2024    CREATININE 0.68 06/07/2024    EGFRNORACEVR >90 06/07/2024    GLUCOSE 171 (H) 06/07/2024    CALCIUM 9.2 06/07/2024    ALKPHOS 114 06/07/2024    LABPROT 7.0 06/07/2024    ALBUMIN 4.2 06/07/2024    AST 38 (H) 06/07/2024    ALT 27 06/07/2024    TSH 4.370 (H) 06/14/2024    KIEUOT9YEPR 0.88 06/14/2024        Lab Results   Component Value Date    HGBA1C 9.5 (H) 06/07/2024        Hematology:  Lab Results   Component Value Date    WBC 9.12 06/07/2024    RBC 4.79 06/07/2024    HGB 14.4 06/07/2024    HCT 41.3 06/07/2024    MCV 86.2 06/07/2024    MCH 30.1 06/07/2024    MCHC 34.9 06/07/2024    RDW 15.0 (H) 06/07/2024     06/07/2024    MPV 12.6 (H) 06/07/2024       Lipid Panel:  Lab Results   Component Value Date    CHOL 167 06/07/2024    HDL 43 06/07/2024    TRIG 120 06/07/2024        Assessment & Plan:  1. Annual visit for general adult medical examination with abnormal findings  -     CBC Auto Differential; Future; Expected date: 06/14/2025  -     Comprehensive Metabolic Panel; Future; Expected date: 06/14/2025  -     TSH; Future; Expected date: 06/14/2025  -     Hemoglobin A1C; Future; Expected date: 06/14/2025  -     Lipid Panel; Future; Expected date: 09/14/2024  -     Microalbumin/Creatinine Ratio, Urine; Future; Expected date: 06/14/2025  -     T4, Free; Future; Expected date: 06/14/2025    2. Uncontrolled type 1 diabetes mellitus with hyperglycemia  Overview:  Diabetes labs:   Lab Results   Component Value Date    HGBA1C 9.4 (H) 07/05/2023      On Insulin Lispro at mealtimes; Lantus 20 units Qam and 16 units Qpm      Assessment & Plan:  Continue current medications.      Need to check CBG QID to follow/administer sliding scale insulin in order to get better control of her glucose.  Would benefit from a continuous glucose monitor.     Take all medications as directed; compliance is critical for managing your diabetes.   Follow American Diabetic Association (ADA) dietary guidelines for eating and implement exercise into your daily regimen.   Check your glucose levels each morning and record for review at follow up.      Orders:  -     Microalbumin/Creatinine Ratio, Urine  -     Hemoglobin A1C; Future; Expected date: 09/14/2024  -     Comprehensive Metabolic Panel; Future; Expected date: 09/14/2024  -     Lipid Panel; Future; Expected date: 06/14/2025    3. Mixed hyperlipidemia  Overview:  On atorvastatin 40 mg daily and Zetia 10 mg daily.    Assessment & Plan:  Lab Results   Component Value Date    CHOL 167 06/07/2024    HDL 43 06/07/2024    TRIG 120 06/07/2024   .5    Increase Atorvastatin to 80 mg daily; continue Zetia 10 mg daily.   LDL Goal:<70   Educated on dietary modifications. Follow a low cholesterol, low saturated fat diet with less that 200mg of cholesterol a day.  Avoid fried foods and high saturated fats.  Increase dietary fiber.  Regular exercise can reduce LDL (bad cholesterol) and raise HDL (good cholesterol). Encourage physical activity 5 times per week for 30 minutes per day.         Orders:  -     atorvastatin (LIPITOR) 80 MG tablet; Take 1 tablet (80 mg total) by mouth once daily.  Dispense: 30 tablet; Refill: 11    4. Primary hypertension  Overview:  On lisinopril 2.5 mg daily and metoprolol 12.5 mg b.i.d..      Assessment & Plan:  Well controlled.   Continue current regimen  Low Sodium Diet (DASH Diet - Less than 2 grams of sodium per day).  Monitor blood pressure daily and log. Report consistent numbers greater than 140/90.  Maintain healthy weight with goal BMI <30. Exercise 30 minutes per day, 5 days per week.  Smoking cessation encouraged to aid in  BP reduction.      Orders:  -     metoprolol tartrate (LOPRESSOR) 25 MG tablet; Take 0.5 tablets (12.5 mg total) by mouth 2 (two) times daily.  Dispense: 30 tablet; Refill: 11    5. Arteriosclerosis of coronary artery  Overview:  On beta blocker, 81 mg of aspirin daily and a statin.    Assessment & Plan:  LDL goal less than 70, currently not at goal.  Increase statin today.      6. S/P right coronary artery (RCA) stent placement  Overview:  On Brilinta    Assessment & Plan:  Compliant.      7. Primary osteoarthritis involving multiple joints  Assessment & Plan:  Takes OTC medications as needed.       8. Recurrent major depressive disorder, in full remission  Overview:  On Paxil 30 mg daily.     Assessment & Plan:  Stable.  Continue current dose of Paxil.      9. Elevated TSH  Comments:  Repeat TSH and add Free T4  Overview:  Repeat TSH and add Free T4    Orders:  -     TSH; Future; Expected date: 06/14/2024  -     T4, Free; Future; Expected date: 06/14/2024    10. Tobacco user  Overview:  1ppd x 40+ years.     Assessment & Plan:  Start Wellbutrin as directed, quit smoking on day 7 of therapy and start patches as directed on that same day.  Follow-up in one-month for smoking cessation continued education.    Educated patient on need to identify triggers for cigarette smoking and to find an alternative to alleviate these triggers such as walking, eating unsalted sunflower seeds, eating carrots. Advised patient to develop a plan to quit smoking whether it is to decrease by a few cigarettes every 3-5 days or quit cold turkey and to schedule a quit day. Patient states understanding.       Orders:  -     buPROPion (WELLBUTRIN SR) 150 MG TBSR 12 hr tablet; Take 1 tablet PO daily x 3 days and then increase to 1 tab PO BID thereafter. Complete therapy in 3 months.  Dispense: 60 tablet; Refill: 2  -     nicotine (NICODERM CQ) 21 mg/24 hr; 21 mg patch daily x 6 weeks, then 14 mg patch daily x 2 week, then 7 mg patch daily x 2  weeks; QUIT smoking on Day 1 of patches  Dispense: 75 patch; Refill: 0    11. Encounter for tobacco use cessation counseling  -     buPROPion (WELLBUTRIN SR) 150 MG TBSR 12 hr tablet; Take 1 tablet PO daily x 3 days and then increase to 1 tab PO BID thereafter. Complete therapy in 3 months.  Dispense: 60 tablet; Refill: 2  -     nicotine (NICODERM CQ) 21 mg/24 hr; 21 mg patch daily x 6 weeks, then 14 mg patch daily x 2 week, then 7 mg patch daily x 2 weeks; QUIT smoking on Day 1 of patches  Dispense: 75 patch; Refill: 0         Cervical Cancer Screening- UTD  Breast Cancer Screening-UTD  Colon Cancer Screening -  UTD  Eye Exam- Recommend annually.  Dental Exam- Recommend biannually.    Vaccinations:  Immunization History   Administered Date(s) Administered    Influenza 11/15/2002, 11/13/2003, 01/13/2017    Influenza - Quadrivalent 09/30/2019, 12/10/2020, 11/03/2021, 10/26/2022    Influenza - Quadrivalent - PF *Preferred* (6 months and older) 10/11/2018, 10/26/2022    Influenza - Trivalent (ADULT) 11/15/2002, 11/13/2003, 01/13/2017    Influenza - Trivalent - PF (ADULT) 01/13/2017, 10/11/2018, 09/30/2019    Pneumococcal Polysaccharide - 23 Valent 01/13/2017       Future Appointments   Date Time Provider Department Center   7/26/2024  8:00 AM Lisandra Flores FNP-C [facility]   9/11/2024  9:30 AM LAB, [facility] LABORATORY DRAW [facility]   9/16/2024  2:30 PM Lisandra Flores FNP-C [facility]   10/23/2024  9:00 AM Carlita Costello MD [facility]   6/11/2025  8:00 AM LAB, [facility] LAB[facility]ATORY [facility]RAW [facility]   6/17/2025  8:30 [facility]       Follow up for 1), 1 mo f/u smoking cessation, 2) 3 mo f/u, DM, Fasting labs prior 3) 1 year, Wellness fastin labs. Call sooner if needed.    Lisandra Flores, CATARINO-C    Lab Frequency Next Occurrence   Ambulatory referral/consult to Endocrinology Once 07/18/2023

## 2024-06-14 NOTE — ASSESSMENT & PLAN NOTE
Start Wellbutrin as directed, quit smoking on day 7 of therapy and start patches as directed on that same day.  Follow-up in one-month for smoking cessation continued education.    Educated patient on need to identify triggers for cigarette smoking and to find an alternative to alleviate these triggers such as walking, eating unsalted sunflower seeds, eating carrots. Advised patient to develop a plan to quit smoking whether it is to decrease by a few cigarettes every 3-5 days or quit cold turkey and to schedule a quit day. Patient states understanding.

## 2024-06-14 NOTE — ASSESSMENT & PLAN NOTE
Lab Results   Component Value Date    CHOL 167 06/07/2024    HDL 43 06/07/2024    TRIG 120 06/07/2024   .5    Increase Atorvastatin to 80 mg daily; continue Zetia 10 mg daily.   LDL Goal:<70   Educated on dietary modifications. Follow a low cholesterol, low saturated fat diet with less that 200mg of cholesterol a day.  Avoid fried foods and high saturated fats.  Increase dietary fiber.  Regular exercise can reduce LDL (bad cholesterol) and raise HDL (good cholesterol). Encourage physical activity 5 times per week for 30 minutes per day.

## 2024-06-14 NOTE — ASSESSMENT & PLAN NOTE
Well controlled.   Continue current regimen  Low Sodium Diet (DASH Diet - Less than 2 grams of sodium per day).  Monitor blood pressure daily and log. Report consistent numbers greater than 140/90.  Maintain healthy weight with goal BMI <30. Exercise 30 minutes per day, 5 days per week.  Smoking cessation encouraged to aid in BP reduction.

## 2024-07-24 DIAGNOSIS — E10.65 UNCONTROLLED TYPE 1 DIABETES MELLITUS WITH HYPERGLYCEMIA: Primary | ICD-10-CM

## 2024-07-24 RX ORDER — BLOOD-GLUCOSE SENSOR
EACH MISCELLANEOUS
Qty: 36 EACH | Refills: 11 | Status: SHIPPED | OUTPATIENT
Start: 2024-07-24

## 2024-07-26 ENCOUNTER — OFFICE VISIT (OUTPATIENT)
Dept: FAMILY MEDICINE | Facility: CLINIC | Age: 59
End: 2024-07-26
Payer: MEDICAID

## 2024-07-26 VITALS
DIASTOLIC BLOOD PRESSURE: 80 MMHG | TEMPERATURE: 97 F | HEART RATE: 82 BPM | HEIGHT: 62 IN | WEIGHT: 146 LBS | OXYGEN SATURATION: 96 % | SYSTOLIC BLOOD PRESSURE: 132 MMHG | BODY MASS INDEX: 26.87 KG/M2

## 2024-07-26 DIAGNOSIS — Z71.6 ENCOUNTER FOR TOBACCO USE CESSATION COUNSELING: Primary | ICD-10-CM

## 2024-07-26 RX ORDER — VARENICLINE TARTRATE 0.5 (11)-1
KIT ORAL
Qty: 1 EACH | Refills: 0 | Status: SHIPPED | OUTPATIENT
Start: 2024-07-26

## 2024-07-26 NOTE — ASSESSMENT & PLAN NOTE
Continue Nicoderm patches.  Start Varenicline as directed; quit smoking 14 days after starting therapy.

## 2024-07-26 NOTE — PROGRESS NOTES
Patient ID: Danitza Lerner  : 1965    Chief Complaint: Nicotine Dependence (1 month follow up )    Allergies: Patient has No Known Allergies.     History of Present Illness:  The patient is a 59 y.o. White female who presents to clinic for follow up on Nicotine Dependence (1 month follow up )     She smokes 1 ppd and at last visit expressed a willingness to quit. Was started on Wellbutrin and nicotine patches. She reports that the Wellbutrin made her feel sick, she had no appetite at all on the medication. She discontinued the medication. She has been using the patches and has cut smoking down to about 1/2 pack.     She has CAD. She has been on Lipitor and Zetia and states compliance. Her LDL is not at goal.     Social History:  reports that she has been smoking cigarettes. She started smoking about 40 years ago. She has a 40.6 pack-year smoking history. She has been exposed to tobacco smoke. She has never used smokeless tobacco. She reports that she does not currently use drugs. She reports that she does not drink alcohol.    Past Medical History:  has a past medical history of CAD (coronary artery disease), Depression, HLD (hyperlipidemia), HTN (hypertension), Myocardial infarction, Osteoarthritis, Presence of stent in coronary artery, and Type 1 diabetes.    Current Medications:  Current Outpatient Medications   Medication Instructions    aspirin (ECOTRIN) 81 mg, Oral, Daily    atorvastatin (LIPITOR) 80 mg, Oral, Daily    blood sugar diagnostic Strp 1 strip, Misc.(Non-Drug; Combo Route), 3 times daily    blood-glucose meter kit To check BG four times daily, to use with insurance preferred meter, TruMetrix if possible    blood-glucose sensor (DEXCOM G7 SENSOR) Heather Change sensor every 10 days    BRILINTA 60 mg, Oral, 2 times daily    cetirizine (ZYRTEC) 10 MG tablet TAKE ONE TABLET BY MOUTH EVERY DAY    diclofenac sodium (VOLTAREN) 2 g, Topical    EASY TOUCH TWIST LANCETS 32 gauge Misc No dose, route, or  "frequency recorded.    ezetimibe (ZETIA) 10 mg, Oral, Daily    fluticasone propionate (FLONASE) 50 mcg/actuation nasal spray Nasal    HUMALOG KWIKPEN INSULIN 100 unit/mL pen USE PER SLIDING (UP TO 50 UNITS PER DAY)    LANTUS SOLOSTAR U-100 INSULIN 36 Units, Subcutaneous    lisinopriL (PRINIVIL,ZESTRIL) 2.5 mg, Oral    metoprolol tartrate (LOPRESSOR) 12.5 mg, Oral, 2 times daily    montelukast (SINGULAIR) 10 mg tablet TAKE ONE TABLET BY MOUTH EVERY DAY    nicotine (NICODERM CQ) 21 mg/24 hr 21 mg patch daily x 6 weeks, then 14 mg patch daily x 2 week, then 7 mg patch daily x 2 weeks; QUIT smoking on Day 1 of patches    paroxetine (PAXIL) 30 mg, Oral    pen needle, diabetic (BD ULTRA-FINE DONNY PEN NEEDLE) 32 gauge x 5/32" Ndle 1 each, Subcutaneous, 4 times daily    TRUE METRIX GLUCOSE TEST STRIP Strp 1 strip, 4 times daily    varenicline (CHANTIX STARTING MONTH BOX) 0.5 mg (11)- 1 mg (42) tablet Take one 0.5mg tab by mouth once daily X3 days,then increase to one 0.5mg tab twice daily X4 days,then increase to one 1mg tab twice daily       Review of Systems   See HPI    Visit Vitals  /80 (BP Location: Left arm)   Pulse 82   Temp 97.2 °F (36.2 °C) (Temporal)   Ht 5' 2" (1.575 m)   Wt 66.2 kg (146 lb)   SpO2 96%   BMI 26.70 kg/m²       Physical Exam  Vitals reviewed.   Constitutional:       Appearance: Normal appearance.   Eyes:      Conjunctiva/sclera: Conjunctivae normal.   Cardiovascular:      Heart sounds: Normal heart sounds.   Pulmonary:      Breath sounds: Normal breath sounds.   Skin:     General: Skin is warm and dry.            Assessment & Plan:  1. Encounter for tobacco use cessation counseling  Overview:  On Nictoine patches and Wellbutrin (start date 06/14/24)  Unable to tolerate Wellbutrin secondary to SE    Assessment & Plan:  Continue Nicoderm patches.  Start Varenicline as directed; quit smoking 14 days after starting therapy.     Orders:  -     varenicline (CHANTIX STARTING MONTH BOX) 0.5 mg (11)- " 1 mg (42) tablet; Take one 0.5mg tab by mouth once daily X3 days,then increase to one 0.5mg tab twice daily X4 days,then increase to one 1mg tab twice daily  Dispense: 1 each; Refill: 0         Future Appointments   Date Time Provider Department Center   9/11/2024  9:30 AM LAB, [facility] LABORATORY DRAW STATION [facility]   9/16/2024  2:30 PM Lisandra Flores FNP-C [facility]   10/23/2024  9:00 AM Carlita Costello MD [facility]   6/11/2025  8:00 AM LAB, [facility] LABORATORY DRAW STATION [facility]   6/17/2025  8:30 [facility]       Follow up for Keep appointment as scheduled. Call sooner if needed.    TRENA Blackman    Lab Frequency Next Occurrence   Ambulatory referral/consult to Endocrinology Once 07/18/2023   Hemoglobin A1C Once 09/14/2024   Comprehensive Metabolic Panel Once 09/14/2024   CBC Auto Differential Once 06/14/2025   Comprehensive Metabolic Panel Once 06/14/2025   Lipid Panel Once 06/14/2025   TSH Once 06/14/2025   Hemoglobin A1C Once 06/14/2025   Lipid Panel Once 09/14/2024   Microalbumin/Creatinine Ratio, Urine Once 06/14/2025   T4, Free Once 06/14/2025   Ambulatory referral/consult to Smoking Cessation Program Once 06/21/2024

## 2024-07-29 ENCOUNTER — PATIENT MESSAGE (OUTPATIENT)
Dept: FAMILY MEDICINE | Facility: CLINIC | Age: 59
End: 2024-07-29
Payer: MEDICAID

## 2024-08-20 ENCOUNTER — HOSPITAL ENCOUNTER (OUTPATIENT)
Dept: RADIOLOGY | Facility: HOSPITAL | Age: 59
Discharge: HOME | End: 2024-08-20
Attending: OBSTETRICS & GYNECOLOGY
Payer: MEDICAID

## 2024-08-20 DIAGNOSIS — Z12.31 BREAST CANCER SCREENING BY MAMMOGRAM: ICD-10-CM

## 2024-08-20 PROCEDURE — 77067 SCR MAMMO BI INCL CAD: CPT | Mod: TC

## 2024-09-10 ENCOUNTER — PATIENT MESSAGE (OUTPATIENT)
Dept: LAB | Facility: CLINIC | Age: 59
End: 2024-09-10
Payer: MEDICAID

## 2024-09-13 PROCEDURE — 80053 COMPREHEN METABOLIC PANEL: CPT | Performed by: NURSE PRACTITIONER

## 2024-09-13 PROCEDURE — 80061 LIPID PANEL: CPT | Performed by: NURSE PRACTITIONER

## 2024-09-13 PROCEDURE — 83036 HEMOGLOBIN GLYCOSYLATED A1C: CPT | Performed by: NURSE PRACTITIONER

## 2024-09-16 ENCOUNTER — OFFICE VISIT (OUTPATIENT)
Dept: FAMILY MEDICINE | Facility: CLINIC | Age: 59
End: 2024-09-16
Payer: MEDICAID

## 2024-09-16 VITALS
OXYGEN SATURATION: 97 % | HEIGHT: 62 IN | BODY MASS INDEX: 26.72 KG/M2 | HEART RATE: 69 BPM | TEMPERATURE: 98 F | DIASTOLIC BLOOD PRESSURE: 72 MMHG | WEIGHT: 145.19 LBS | SYSTOLIC BLOOD PRESSURE: 118 MMHG

## 2024-09-16 DIAGNOSIS — Z72.0 TOBACCO USER: ICD-10-CM

## 2024-09-16 DIAGNOSIS — E10.65 UNCONTROLLED TYPE 1 DIABETES MELLITUS WITH HYPERGLYCEMIA: Primary | ICD-10-CM

## 2024-09-16 PROCEDURE — 3074F SYST BP LT 130 MM HG: CPT | Mod: CPTII,,, | Performed by: NURSE PRACTITIONER

## 2024-09-16 PROCEDURE — 3078F DIAST BP <80 MM HG: CPT | Mod: CPTII,,, | Performed by: NURSE PRACTITIONER

## 2024-09-16 PROCEDURE — 3066F NEPHROPATHY DOC TX: CPT | Mod: CPTII,,, | Performed by: NURSE PRACTITIONER

## 2024-09-16 PROCEDURE — 99213 OFFICE O/P EST LOW 20 MIN: CPT | Mod: ,,, | Performed by: NURSE PRACTITIONER

## 2024-09-16 PROCEDURE — 3008F BODY MASS INDEX DOCD: CPT | Mod: CPTII,,, | Performed by: NURSE PRACTITIONER

## 2024-09-16 PROCEDURE — 1159F MED LIST DOCD IN RCRD: CPT | Mod: CPTII,,, | Performed by: NURSE PRACTITIONER

## 2024-09-16 PROCEDURE — 1160F RVW MEDS BY RX/DR IN RCRD: CPT | Mod: CPTII,,, | Performed by: NURSE PRACTITIONER

## 2024-09-16 PROCEDURE — 3052F HG A1C>EQUAL 8.0%<EQUAL 9.0%: CPT | Mod: CPTII,,, | Performed by: NURSE PRACTITIONER

## 2024-09-16 PROCEDURE — 3061F NEG MICROALBUMINURIA REV: CPT | Mod: CPTII,,, | Performed by: NURSE PRACTITIONER

## 2024-09-16 PROCEDURE — 4010F ACE/ARB THERAPY RXD/TAKEN: CPT | Mod: CPTII,,, | Performed by: NURSE PRACTITIONER

## 2024-09-16 RX ORDER — BLOOD-GLUCOSE SENSOR
EACH MISCELLANEOUS
Qty: 4 EACH | Refills: 11 | Status: SHIPPED | OUTPATIENT
Start: 2024-09-16

## 2024-09-16 NOTE — ASSESSMENT & PLAN NOTE
Diabetes labs:   Lab Results   Component Value Date    HGBA1C 8.6 (H) 09/13/2024      Continue current medications.   Take all medications as directed; compliance is critical for managing your diabetes.   Follow American Diabetic Association (ADA) dietary guidelines for eating and implement exercise into your daily regimen.   Check your glucose levels each morning and record for review at follow up.

## 2024-09-16 NOTE — ASSESSMENT & PLAN NOTE
Tried Wellbutrin, Chantix and Nicoderm patches and failed smoking cessation.   Educated patient on need to identify triggers for cigarette smoking and to find an alternative to alleviate these triggers such as walking, eating unsalted sunflower seeds, eating carrots. Advised patient to develop a plan to quit smoking whether it is to decrease by a few cigarettes every 3-5 days or quit cold turkey and to schedule a quit day. Patient states understanding.

## 2024-09-16 NOTE — PROGRESS NOTES
Patient ID: Danitza Lerner  : 1965    Chief Complaint: Diabetes (3 month follow up, labs prior)    Allergies: Patient has No Known Allergies.     History of Present Illness:  The patient is a 59 y.o. White female who presents to clinic for follow up on Diabetes (3 month follow up, labs prior)     She is doing well; diabetes better controlled since getting the Dexcom. She does not have the readings with her, the last meter was defective and did not have a needle. She eats off course regularly; cokes, candies, etc...    [facility]    Social History:  reports that she has been smoking cigarettes. She started smoking about 40 years ago. She has a 40.7 pack-year smoking history. She has been exposed to tobacco smoke. She has never used smokeless tobacco. She reports that she does not currently use drugs. She reports that she does not drink alcohol.    Past Medical History:  has a past medical history of CAD (coronary artery disease), Depression, HLD (hyperlipidemia), HTN (hypertension), Myocardial infarction, Osteoarthritis, Presence of stent in coronary artery, and Type 1 diabetes.    Current Medications:  Current Outpatient Medications   Medication Instructions    aspirin (ECOTRIN) 81 mg, Oral, Daily    atorvastatin (LIPITOR) 80 mg, Oral, Daily    blood sugar diagnostic Strp 1 strip, Misc.(Non-Drug; Combo Route), 3 times daily    blood-glucose meter kit To check BG four times daily, to use with insurance preferred meter, TruMetrix if possible    blood-glucose sensor (DEXCOM G7 SENSOR) Heathre Change sensor every 10 days    BRILINTA 60 mg, Oral, 2 times daily    cetirizine (ZYRTEC) 10 MG tablet TAKE ONE TABLET BY MOUTH EVERY DAY    diclofenac sodium (VOLTAREN) 2 g, Topical    EASY TOUCH TWIST LANCETS 32 gauge Misc No dose, route, or frequency recorded.    ezetimibe (ZETIA) 10 mg, Oral, Daily    fluticasone propionate (FLONASE) 50 mcg/actuation nasal spray Nasal    HUMALOG  "KWIKPEN INSULIN 100 unit/mL pen USE PER SLIDING (UP TO 50 UNITS PER DAY)    LANTUS SOLOSTAR U-100 INSULIN 36 Units, Subcutaneous    lisinopriL (PRINIVIL,ZESTRIL) 2.5 mg, Oral    metoprolol tartrate (LOPRESSOR) 12.5 mg, Oral, 2 times daily    montelukast (SINGULAIR) 10 mg tablet TAKE ONE TABLET BY MOUTH EVERY DAY    paroxetine (PAXIL) 30 mg, Oral    pen needle, diabetic (BD ULTRA-FINE DONNY PEN NEEDLE) 32 gauge x 5/32" Ndle 1 each, Subcutaneous, 4 times daily    TRUE METRIX GLUCOSE TEST STRIP Strp 1 strip, 4 times daily       Review of Systems   See HPI    Visit Vitals  /72 (BP Location: Left arm, Patient Position: Sitting, BP Method: Medium (Manual))   Pulse 69   Temp 97.9 °F (36.6 °C) (Temporal)   Ht 5' 2.01" (1.575 m)   Wt 65.9 kg (145 lb 3.2 oz)   SpO2 97%   BMI 26.55 kg/m²       Physical Exam  Vitals reviewed.   Constitutional:       Appearance: Normal appearance.   Eyes:      Conjunctiva/sclera: Conjunctivae normal.   Cardiovascular:      Heart sounds: Normal heart sounds.   Pulmonary:      Breath sounds: Normal breath sounds.   Skin:     General: Skin is warm and dry.   Neurological:      Mental Status: She is oriented to person, place, and time.          Labs Reviewed:  Chemistry:  Lab Results   Component Value Date     09/13/2024    K 4.4 09/13/2024    BUN 9 09/13/2024    CREATININE 0.66 09/13/2024    EGFRNORACEVR >90 09/13/2024    GLUCOSE 234 (H) 09/13/2024    CALCIUM 9.6 09/13/2024    ALKPHOS 109 09/13/2024    LABPROT 7.0 09/13/2024    ALBUMIN 4.2 09/13/2024    AST 30 09/13/2024    ALT 28 09/13/2024    TSH 4.370 (H) 06/14/2024    [id number] 0.88 06/14/2024        Lab Results   Component Value Date    HGBA1C 8.6 (H) 09/13/2024      Lipid Panel:  Lab Results   Component Value Date    CHOL 86 09/13/2024    HDL 38 (L) 09/13/2024    LDLDIRECT 30.6 09/13/2024    TRIG 91 09/13/2024        Assessment & Plan:  1. Uncontrolled type 1 diabetes mellitus with hyperglycemia  Overview:  On Insulin Lispro at " mealtimes; Lantus 20 units Qam and 16 units Qpm  Has Dexcom Monitor      Assessment & Plan:  Diabetes labs:   Lab Results   Component Value Date    HGBA1C 8.6 (H) 09/13/2024      Continue current medications.   Take all medications as directed; compliance is critical for managing your diabetes.   Follow American Diabetic Association (ADA) dietary guidelines for eating and implement exercise into your daily regimen.   Check your glucose levels each morning and record for review at follow up.      Orders:  -     blood-glucose sensor (DEXCOM G7 SENSOR) Heather; Change sensor every 10 days  Dispense: 4 each; Refill: 11    2. Tobacco user  Overview:  1ppd x 40+ years.     Assessment & Plan:  Tried Wellbutrin, Chantix and Nicoderm patches and failed smoking cessation.   Educated patient on need to identify triggers for cigarette smoking and to find an alternative to alleviate these triggers such as walking, eating unsalted sunflower seeds, eating carrots. Advised patient to develop a plan to quit smoking whether it is to decrease by a few cigarettes every 3-5 days or quit cold turkey and to schedule a quit day. Patient states understanding.              Future Appointments   Date Time Provider Department Center   10/23/2024  9:00 AM Carlita Costello MD [facility]   6/11/2025  8:00 AM LAB, [facility] LABORATORY DRAW STATION [facility]   6/17/2025  8:30 AM Lisandra Flores FNP-C [facility]       Follow up for 3 mo f/u, DM, Fasting Labs prior. Call sooner if needed.    TRENA Blackman    Lab Frequency Next Occurrence   CBC Auto Differential Once 06/14/2025   Comprehensive Metabolic Panel Once 06/14/2025   Lipid Panel Once 06/14/2025   TSH Once 06/14/2025   Hemoglobin A1C Once 06/14/2025   Microalbumin/Creatinine Ratio, Urine Once 06/14/2025   T4, Free Once 06/14/2025   Ambulatory referral/consult to Smoking Cessation Program Once 06/21/2024

## 2024-10-21 DIAGNOSIS — F32.9 MAJOR DEPRESSIVE DISORDER, REMISSION STATUS UNSPECIFIED, UNSPECIFIED WHETHER RECURRENT: ICD-10-CM

## 2024-10-21 RX ORDER — PAROXETINE 30 MG/1
30 TABLET, FILM COATED ORAL
Qty: 30 TABLET | Refills: 5 | Status: SHIPPED | OUTPATIENT
Start: 2024-10-21

## 2024-12-17 PROCEDURE — 80053 COMPREHEN METABOLIC PANEL: CPT | Performed by: NURSE PRACTITIONER

## 2024-12-17 PROCEDURE — 83036 HEMOGLOBIN GLYCOSYLATED A1C: CPT | Performed by: NURSE PRACTITIONER

## 2025-01-27 ENCOUNTER — TELEPHONE (OUTPATIENT)
Dept: FAMILY MEDICINE | Facility: CLINIC | Age: 60
End: 2025-01-27

## 2025-04-29 DIAGNOSIS — F32.9 MAJOR DEPRESSIVE DISORDER, REMISSION STATUS UNSPECIFIED, UNSPECIFIED WHETHER RECURRENT: ICD-10-CM

## 2025-04-29 RX ORDER — PAROXETINE 30 MG/1
30 TABLET, FILM COATED ORAL
Qty: 30 TABLET | Refills: 5 | Status: SHIPPED | OUTPATIENT
Start: 2025-04-29

## 2025-04-29 NOTE — TELEPHONE ENCOUNTER
She no showed her 3mo follow up and cancelled multiple appointments. Last labs were in December. Last seen September. She is scheduled for wellness with labs prior 6/17/25. Please advise on refill

## 2025-05-28 DIAGNOSIS — E78.2 MIXED HYPERLIPIDEMIA: ICD-10-CM

## 2025-05-28 RX ORDER — ATORVASTATIN CALCIUM 80 MG/1
80 TABLET, FILM COATED ORAL
Qty: 30 TABLET | Refills: 11 | Status: SHIPPED | OUTPATIENT
Start: 2025-05-28

## 2025-05-29 DIAGNOSIS — E10.65 UNCONTROLLED TYPE 1 DIABETES MELLITUS WITH HYPERGLYCEMIA: ICD-10-CM

## 2025-05-29 RX ORDER — INSULIN GLARGINE 100 [IU]/ML
36 INJECTION, SOLUTION SUBCUTANEOUS DAILY
Qty: 15 ML | Refills: 0 | Status: SHIPPED | OUTPATIENT
Start: 2025-05-29

## 2025-06-04 DIAGNOSIS — E11.9 TYPE 2 DIABETES MELLITUS WITHOUT COMPLICATION, UNSPECIFIED WHETHER LONG TERM INSULIN USE: ICD-10-CM

## 2025-06-04 RX ORDER — INSULIN LISPRO 100 [IU]/ML
INJECTION, SOLUTION INTRAVENOUS; SUBCUTANEOUS
Qty: 15 ML | Refills: 0 | Status: SHIPPED | OUTPATIENT
Start: 2025-06-04

## 2025-06-18 ENCOUNTER — TELEPHONE (OUTPATIENT)
Dept: FAMILY MEDICINE | Facility: CLINIC | Age: 60
End: 2025-06-18

## 2025-06-18 NOTE — TELEPHONE ENCOUNTER
I was unable to contact pt due to the phone numbers not being good. I called [facility] pharmacy and was given [phone]. When I called, pt's sister Gia answered. She said that pt is living with her now and she is trying to help her get her medicaid back. She is going to have Danitza call us when she gets home.     I also spoke with Coreen @ [facility]. Pt's last visit was 6/2024. She was scheduled 3/3/25 and no showed. They were unable to contact her.

## 2025-07-02 DIAGNOSIS — E11.9 TYPE 2 DIABETES MELLITUS WITHOUT COMPLICATION, UNSPECIFIED WHETHER LONG TERM INSULIN USE: ICD-10-CM

## 2025-07-02 DIAGNOSIS — I10 PRIMARY HYPERTENSION: ICD-10-CM

## 2025-07-02 RX ORDER — METOPROLOL TARTRATE 25 MG/1
12.5 TABLET, FILM COATED ORAL 2 TIMES DAILY
Qty: 30 TABLET | Refills: 0 | OUTPATIENT
Start: 2025-07-02

## 2025-07-02 RX ORDER — INSULIN LISPRO 100 [IU]/ML
INJECTION, SOLUTION INTRAVENOUS; SUBCUTANEOUS
Qty: 15 ML | Refills: 0 | OUTPATIENT
Start: 2025-07-02

## 2025-07-03 DIAGNOSIS — E10.65 UNCONTROLLED TYPE 1 DIABETES MELLITUS WITH HYPERGLYCEMIA: ICD-10-CM

## 2025-07-03 DIAGNOSIS — E11.9 TYPE 2 DIABETES MELLITUS WITHOUT COMPLICATION, UNSPECIFIED WHETHER LONG TERM INSULIN USE: ICD-10-CM

## 2025-07-03 DIAGNOSIS — F32.9 MAJOR DEPRESSIVE DISORDER, REMISSION STATUS UNSPECIFIED, UNSPECIFIED WHETHER RECURRENT: ICD-10-CM

## 2025-07-03 DIAGNOSIS — I10 PRIMARY HYPERTENSION: ICD-10-CM

## 2025-07-03 RX ORDER — INSULIN GLARGINE 100 [IU]/ML
36 INJECTION, SOLUTION SUBCUTANEOUS DAILY
Qty: 15 ML | Refills: 0 | Status: SHIPPED | OUTPATIENT
Start: 2025-07-03

## 2025-07-03 RX ORDER — PAROXETINE 30 MG/1
30 TABLET, FILM COATED ORAL DAILY
Qty: 30 TABLET | Refills: 0 | Status: SHIPPED | OUTPATIENT
Start: 2025-07-03

## 2025-07-03 RX ORDER — METOPROLOL TARTRATE 25 MG/1
12.5 TABLET, FILM COATED ORAL 2 TIMES DAILY
Qty: 30 TABLET | Refills: 0 | Status: SHIPPED | OUTPATIENT
Start: 2025-07-03

## 2025-07-03 RX ORDER — INSULIN LISPRO 100 [IU]/ML
INJECTION, SOLUTION INTRAVENOUS; SUBCUTANEOUS
Qty: 15 ML | Refills: 0 | Status: SHIPPED | OUTPATIENT
Start: 2025-07-03

## 2025-07-03 NOTE — TELEPHONE ENCOUNTER
Pt was last seen 9/16/2024 and was supposed to follow up in 3 months. She lost her insurance and just recently got it back so she is scheduled for 7/8/25 and going to the hospital on Monday for her labs. Please advise on refills.

## 2025-07-07 ENCOUNTER — APPOINTMENT (OUTPATIENT)
Dept: LAB | Facility: HOSPITAL | Age: 60
End: 2025-07-07
Attending: NURSE PRACTITIONER
Payer: MEDICAID

## 2025-07-07 DIAGNOSIS — E10.65 UNCONTROLLED TYPE 1 DIABETES MELLITUS WITH HYPERGLYCEMIA: ICD-10-CM

## 2025-07-07 DIAGNOSIS — Z00.01 ANNUAL VISIT FOR GENERAL ADULT MEDICAL EXAMINATION WITH ABNORMAL FINDINGS: ICD-10-CM

## 2025-07-07 LAB
ALBUMIN SERPL-MCNC: 3.8 G/DL (ref 3.4–5)
ALBUMIN/CREAT UR: NORMAL
ALBUMIN/GLOB SERPL: 1.4 RATIO
ALP SERPL-CCNC: 89 UNIT/L (ref 50–144)
ALT SERPL-CCNC: 20 UNIT/L (ref 1–45)
ANION GAP SERPL CALC-SCNC: 4 MEQ/L (ref 2–13)
AST SERPL-CCNC: 27 UNIT/L (ref 14–36)
BASOPHILS # BLD AUTO: 0.1 X10(3)/MCL (ref 0.01–0.08)
BASOPHILS NFR BLD AUTO: 1 % (ref 0.1–1.2)
BILIRUB SERPL-MCNC: 0.5 MG/DL (ref 0–1)
BUN SERPL-MCNC: 13 MG/DL (ref 7–20)
CALCIUM SERPL-MCNC: 9.2 MG/DL (ref 8.4–10.2)
CHLORIDE SERPL-SCNC: 109 MMOL/L (ref 98–110)
CHOLEST SERPL-MCNC: 94 MG/DL (ref 0–200)
CO2 SERPL-SCNC: 23 MMOL/L (ref 21–32)
CREAT SERPL-MCNC: 0.73 MG/DL (ref 0.66–1.25)
CREAT UR-MCNC: 77.9 MG/DL (ref 45–106)
CREAT/UREA NIT SERPL: 18 (ref 12–20)
EOSINOPHIL # BLD AUTO: 0.29 X10(3)/MCL (ref 0.04–0.36)
EOSINOPHIL NFR BLD AUTO: 2.8 % (ref 0.7–7)
ERYTHROCYTE [DISTWIDTH] IN BLOOD BY AUTOMATED COUNT: 15.7 % (ref 11–14.5)
EST. AVERAGE GLUCOSE BLD GHB EST-MCNC: 228.8 MG/DL (ref 70–115)
GFR SERPLBLD CREATININE-BSD FMLA CKD-EPI: >90 ML/MIN/1.73/M2
GLOBULIN SER-MCNC: 2.8 GM/DL (ref 2–3.9)
GLUCOSE SERPL-MCNC: 370 MG/DL (ref 70–115)
HBA1C MFR BLD: 9.6 % (ref 4–6)
HCT VFR BLD AUTO: 40.3 % (ref 36–48)
HDLC SERPL-MCNC: 36 MG/DL (ref 40–60)
HGB BLD-MCNC: 13.1 G/DL (ref 11.8–16)
IMM GRANULOCYTES # BLD AUTO: 0.02 X10(3)/MCL (ref 0–0.03)
IMM GRANULOCYTES NFR BLD AUTO: 0.2 % (ref 0–0.5)
LDLC SERPL DIRECT ASSAY-SCNC: <30 MG/DL (ref 30–100)
LYMPHOCYTES # BLD AUTO: 4.52 X10(3)/MCL (ref 1.16–3.74)
LYMPHOCYTES NFR BLD AUTO: 43.5 % (ref 20–55)
MCH RBC QN AUTO: 28.9 PG (ref 27–34)
MCHC RBC AUTO-ENTMCNC: 32.5 G/DL (ref 31–37)
MCV RBC AUTO: 88.8 FL (ref 79–99)
MICROALBUMIN UR-MCNC: <5 UG/ML
MONOCYTES # BLD AUTO: 0.71 X10(3)/MCL (ref 0.24–0.36)
MONOCYTES NFR BLD AUTO: 6.8 % (ref 4.7–12.5)
NEUTROPHILS # BLD AUTO: 4.75 X10(3)/MCL (ref 1.56–6.13)
NEUTROPHILS NFR BLD AUTO: 45.7 % (ref 37–73)
NRBC BLD AUTO-RTO: 0 %
PLATELET # BLD AUTO: 201 X10(3)/MCL (ref 140–371)
PMV BLD AUTO: 12 FL (ref 9.4–12.4)
POTASSIUM SERPL-SCNC: 4.9 MMOL/L (ref 3.5–5.1)
PROT SERPL-MCNC: 6.6 GM/DL (ref 6.3–8.2)
RBC # BLD AUTO: 4.54 X10(6)/MCL (ref 4–5.1)
SODIUM SERPL-SCNC: 136 MMOL/L (ref 136–145)
T4 FREE SERPL-MCNC: 0.92 NG/DL (ref 0.78–2.19)
TRIGL SERPL-MCNC: 126 MG/DL (ref 30–200)
TSH SERPL-ACNC: 6.81 UIU/ML (ref 0.36–3.74)
WBC # BLD AUTO: 10.39 X10(3)/MCL (ref 4–11.5)

## 2025-07-07 PROCEDURE — 85025 COMPLETE CBC W/AUTO DIFF WBC: CPT

## 2025-07-07 PROCEDURE — 36415 COLL VENOUS BLD VENIPUNCTURE: CPT

## 2025-07-07 PROCEDURE — 80053 COMPREHEN METABOLIC PANEL: CPT

## 2025-07-07 PROCEDURE — 84439 ASSAY OF FREE THYROXINE: CPT

## 2025-07-07 PROCEDURE — 80061 LIPID PANEL: CPT

## 2025-07-07 PROCEDURE — 82043 UR ALBUMIN QUANTITATIVE: CPT

## 2025-07-07 PROCEDURE — 83036 HEMOGLOBIN GLYCOSYLATED A1C: CPT

## 2025-07-07 PROCEDURE — 84443 ASSAY THYROID STIM HORMONE: CPT

## 2025-07-08 ENCOUNTER — OFFICE VISIT (OUTPATIENT)
Dept: FAMILY MEDICINE | Facility: CLINIC | Age: 60
End: 2025-07-08
Payer: MEDICAID

## 2025-07-08 VITALS
BODY MASS INDEX: 24.22 KG/M2 | DIASTOLIC BLOOD PRESSURE: 74 MMHG | OXYGEN SATURATION: 97 % | TEMPERATURE: 97 F | WEIGHT: 131.63 LBS | SYSTOLIC BLOOD PRESSURE: 116 MMHG | HEART RATE: 66 BPM | HEIGHT: 62 IN

## 2025-07-08 DIAGNOSIS — E10.65 UNCONTROLLED TYPE 1 DIABETES MELLITUS WITH HYPERGLYCEMIA: ICD-10-CM

## 2025-07-08 DIAGNOSIS — Z00.01 ENCOUNTER FOR WELL ADULT EXAM WITH ABNORMAL FINDINGS: Primary | ICD-10-CM

## 2025-07-08 DIAGNOSIS — I10 PRIMARY HYPERTENSION: ICD-10-CM

## 2025-07-08 DIAGNOSIS — M15.0 PRIMARY OSTEOARTHRITIS INVOLVING MULTIPLE JOINTS: ICD-10-CM

## 2025-07-08 DIAGNOSIS — E03.8 SUBCLINICAL HYPOTHYROIDISM: ICD-10-CM

## 2025-07-08 DIAGNOSIS — Z95.5 S/P RIGHT CORONARY ARTERY (RCA) STENT PLACEMENT: ICD-10-CM

## 2025-07-08 DIAGNOSIS — E78.5 HYPERLIPIDEMIA, UNSPECIFIED HYPERLIPIDEMIA TYPE: ICD-10-CM

## 2025-07-08 DIAGNOSIS — I25.10 ARTERIOSCLEROSIS OF CORONARY ARTERY: ICD-10-CM

## 2025-07-08 DIAGNOSIS — Z72.0 TOBACCO USER: ICD-10-CM

## 2025-07-08 DIAGNOSIS — F33.42 RECURRENT MAJOR DEPRESSIVE DISORDER, IN FULL REMISSION: ICD-10-CM

## 2025-07-08 RX ORDER — TICAGRELOR 60 MG/1
60 TABLET ORAL 2 TIMES DAILY
Qty: 60 TABLET | Refills: 11 | Status: SHIPPED | OUTPATIENT
Start: 2025-07-08

## 2025-07-08 RX ORDER — INSULIN LISPRO 100 [IU]/ML
INJECTION, SOLUTION INTRAVENOUS; SUBCUTANEOUS
Qty: 15 ML | Refills: 11 | Status: SHIPPED | OUTPATIENT
Start: 2025-07-08

## 2025-07-08 RX ORDER — PAROXETINE 30 MG/1
30 TABLET, FILM COATED ORAL DAILY
Qty: 30 TABLET | Refills: 11 | Status: SHIPPED | OUTPATIENT
Start: 2025-07-08

## 2025-07-08 RX ORDER — INSULIN GLARGINE 100 [IU]/ML
36 INJECTION, SOLUTION SUBCUTANEOUS DAILY
Qty: 12 ML | Refills: 11 | Status: SHIPPED | OUTPATIENT
Start: 2025-07-08

## 2025-07-08 RX ORDER — BLOOD-GLUCOSE SENSOR
EACH MISCELLANEOUS
Status: CANCELLED | OUTPATIENT
Start: 2025-07-08 | End: 2026-07-08

## 2025-07-08 RX ORDER — EZETIMIBE 10 MG/1
10 TABLET ORAL DAILY
Qty: 30 TABLET | Refills: 11 | Status: SHIPPED | OUTPATIENT
Start: 2025-07-08

## 2025-07-08 RX ORDER — BLOOD-GLUCOSE SENSOR
EACH MISCELLANEOUS
Qty: 3 EACH | Refills: 11 | Status: SHIPPED | OUTPATIENT
Start: 2025-07-08

## 2025-07-08 RX ORDER — LISINOPRIL 2.5 MG/1
2.5 TABLET ORAL DAILY
Qty: 30 TABLET | Refills: 11 | Status: SHIPPED | OUTPATIENT
Start: 2025-07-08

## 2025-07-08 RX ORDER — BLOOD-GLUCOSE,RECEIVER,CONT
1 EACH MISCELLANEOUS 4 TIMES DAILY
Qty: 1 EACH | Refills: 0 | Status: SHIPPED | OUTPATIENT
Start: 2025-07-08 | End: 2026-07-08

## 2025-07-08 NOTE — PROGRESS NOTES
Patient ID: Danitza Lerner  : 1965    Chief Complaint: Medication Refill    Allergies: Patient has no known allergies.     Subjective :  The patient is a 60 y.o. White female who presents to clinic for follow up on Medication Refill and Wellness visit  History of Present Illness    HPI:  Patient reports ongoing struggles with managing her diabetes. Her most recent A1C was 9.6, higher than her previous reading in December. Her blood sugar was 370 yesterday. She has made lifestyle changes, including increased exercise and dietary modifications such as eating more salads, cucumbers, lettuce, and tomatoes. She has lost weight since her last visit in September, going from 145 lbs to 131 lbs.    She is currently taking insulin, with a regimen of 10 units of Lantus in the morning and 10 units at night. She reports that if she takes more than 10 units at night, her blood sugar drops, and she wakes up feeling unwell. She uses a Dexcom Seven continuous glucose monitor, changing the sensor every 10 days. She also takes Metoprolol, half a tablet twice daily.    She recently injured her feet when she slipped on wood, causing her toes to hyperextend. This resulted in bruising on her knees, which has since healed, but she still has pain on the top of her feet.    She discusses recent life changes, including a separation from her partner and moving in with her sister. She is helping to care for her sister's children and grandchildren, which keeps her busy.    She denies any recent weight gain.    MEDICATIONS:  Patient is on Lantus, administered as a subcutaneous injection, with a dosage of 10 units in the morning and 10 units at night for diabetes management. She is also on Humalog, administered as a subcutaneous injection, for diabetes. Patient takes Metoprolol, half a tablet twice daily orally, and Paroxetine orally for depression. She is also on oral Lipitor and Ezetimibe. Her Lantus dosage was previously 20 units in  the morning and 16 units in the evening but was changed to the current regimen due to blood sugar dropping during the night.    MEDICAL HISTORY:  Patient has a history of diabetes, thyroid condition, and depression.    TEST RESULTS:  Patient underwent several labs yesterday morning. Her CBC, kidney function, and electrolytes looked good, with the kidney function described as fantastic and electrolytes balanced. Her blood sugar was 370, while her cholesterol was way below the goal. The thyroid test remained the same, still showing slight abnormality. Her current A1C is 9.6, which is higher than in December. The urinalysis looked good, showing no apparent damage. Patient underwent a diabetic microalbumin test yesterday morning.    SOCIAL HISTORY:  Smoking: Current smoker, but reduced amount      ROS:  ROS as indicated in HPI.           Social History:  reports that she has been smoking cigarettes. She started smoking about 41 years ago. She has a 40.8 pack-year smoking history. She has been exposed to tobacco smoke. She has never used smokeless tobacco. She reports that she does not currently use drugs. She reports that she does not drink alcohol.    Past Medical History:  has a past medical history of CAD (coronary artery disease), Depression, HLD (hyperlipidemia), HTN (hypertension), Myocardial infarction, Osteoarthritis, Presence of stent in coronary artery, and Type 1 diabetes.    Current Medications:  Current Outpatient Medications   Medication Instructions    aspirin (ECOTRIN) 81 mg, Oral, Daily    atorvastatin (LIPITOR) 80 mg, Oral    blood sugar diagnostic Strp 1 strip, 3 times daily    blood-glucose meter kit To check BG four times daily, to use with insurance preferred meter, TruMetrix if possible    blood-glucose sensor (DEXCOM G7 SENSOR) Heather Change sensor every 10 days    blood-glucose,,cont (DEXCOM G7 ) Misc 1 each, Misc.(Non-Drug; Combo Route), 4 times daily    BRILINTA 60 mg, Oral, 2  "times daily    diclofenac sodium (VOLTAREN) 2 g    EASY TOUCH TWIST LANCETS 32 gauge Misc No dose, route, or frequency recorded.    ezetimibe (ZETIA) 10 mg, Oral, Daily    fluticasone propionate (FLONASE) 50 mcg/actuation nasal spray by Nasal route.    insulin lispro 100 unit/mL pen USE as directed PER SLIDING scale (UP TO 50 UNITS PER DAY)    LANTUS SOLOSTAR U-100 INSULIN 36 Units, Subcutaneous, Daily    lisinopriL (PRINIVIL,ZESTRIL) 2.5 mg, Oral, Daily    metoprolol tartrate (LOPRESSOR) 12.5 mg, Oral, 2 times daily    paroxetine (PAXIL) 30 mg, Oral, Daily    pen needle, diabetic (BD ULTRA-FINE DONNY PEN NEEDLE) 32 gauge x 5/32" Ndle 1 each, Subcutaneous, 4 times daily    TRUE METRIX GLUCOSE TEST STRIP Strp 1 strip, 4 times daily         Visit Vitals  /74 (BP Location: Left arm, Patient Position: Sitting)   Pulse 66   Temp 97.3 °F (36.3 °C) (Temporal)   Ht 5' 2.01" (1.575 m)   Wt 59.7 kg (131 lb 9.6 oz)   SpO2 97%   BMI 24.06 kg/m²       Physical Exam  Vitals reviewed.   Constitutional:       Appearance: Normal appearance. She is normal weight.   Eyes:      Conjunctiva/sclera: Conjunctivae normal.   Cardiovascular:      Rate and Rhythm: Normal rate and regular rhythm.      Pulses: Normal pulses.      Heart sounds: Normal heart sounds.   Pulmonary:      Effort: Pulmonary effort is normal.      Breath sounds: Normal breath sounds.   Abdominal:      General: Bowel sounds are normal.      Palpations: Abdomen is soft.   Musculoskeletal:      Cervical back: Neck supple.   Skin:     General: Skin is warm and dry.      Capillary Refill: Capillary refill takes less than 2 seconds.   Neurological:      Mental Status: She is alert and oriented to person, place, and time.   Psychiatric:         Mood and Affect: Mood normal.         Behavior: Behavior normal.            Protective Sensation (w/ 10 gram monofilament):  Right: Intact  Left: Intact    Visual Inspection:  Nails Intact - with Evidence of Foot Deformity- Right , " Dry Skin -  Bilateral, and Onychomycosis -  Right     Pedal Pulses:   Right: Present  Left: Present    Posterior Tibialis Pulses:   Right:Present  Left: Present        Labs Reviewed:  Chemistry:  Lab Results   Component Value Date     07/07/2025    K 4.9 07/07/2025    BUN 13 07/07/2025    CREATININE 0.73 07/07/2025    EGFRNORACEVR >90 07/07/2025    CALCIUM 9.2 07/07/2025    ALKPHOS 89 07/07/2025    ALBUMIN 3.8 07/07/2025    AST 27 07/07/2025    ALT 20 07/07/2025    TSH 6.810 (H) 07/07/2025    [id number] 0.92 07/07/2025        Lab Results   Component Value Date    HGBA1C 9.6 (H) 07/07/2025        Hematology:  Lab Results   Component Value Date    WBC 10.39 07/07/2025    RBC 4.54 07/07/2025    HGB 13.1 07/07/2025    HCT 40.3 07/07/2025    MCV 88.8 07/07/2025    MCH 28.9 07/07/2025    MCHC 32.5 07/07/2025    RDW 15.7 (H) 07/07/2025     07/07/2025    MPV 12.0 07/07/2025       Lipid Panel:  Lab Results   Component Value Date    CHOL 94 07/07/2025    HDL 36 (L) 07/07/2025    LDLDIRECT <30.0 (L) 07/07/2025    TRIG 126 07/07/2025        Assessment & Plan:  1. Encounter for well adult exam with abnormal findings    2. Uncontrolled type 1 diabetes mellitus with hyperglycemia  Overview:  On Insulin Lispro at mealtimes; Lantus 20 units Qam and 16 units Qpm  Has Dexcom Monitor      Assessment & Plan:  Diabetes labs:   Lab Results   Component Value Date    HGBA1C 9.6 (H) 07/07/2025      Diabetic foot exam done today.   Continue current medications; advise that she increase her a.m. dose of Lantus (increase by 2 units every 2-3 days), can leave the nighttime dose at 10 units as she reports evening lows in her blood sugar.  I would like to follow-up with her and be able to see her Dexcom log which she does not have with her today so that we can figure out how to adjust insulin to attain better control.  Take all medications as directed; compliance is critical for managing your diabetes.   Follow American Diabetic  Association (ADA) dietary guidelines for eating and implement exercise into your daily regimen.   Check your glucose levels each morning and record for review at follow up.      Orders:  -     blood-glucose sensor (DEXCOM G7 SENSOR) Heather; Change sensor every 10 days  Dispense: 3 each; Refill: 11  -     insulin glargine U-100, Lantus, (LANTUS SOLOSTAR U-100 INSULIN) 100 unit/mL (3 mL) InPn pen; Inject 36 Units into the skin once daily.  Dispense: 12 mL; Refill: 11  -     blood-glucose,,cont (DEXCOM G7 ) Misc; 1 each by Misc.(Non-Drug; Combo Route) route 4 (four) times daily.  Dispense: 1 each; Refill: 0    3. Hyperlipidemia, unspecified hyperlipidemia type  Overview:  On atorvastatin 40 mg daily and Zetia 10 mg daily.  06/2024: ; atorvastatin increased to 80 mg daily, continued Zetia    Assessment & Plan:  Lab Results   Component Value Date    CHOL 94 07/07/2025    HDL 36 (L) 07/07/2025    LDLDIRECT <30.0 (L) 07/07/2025    TRIG 126 07/07/2025     Stable. Continue current therapy.  LDL Goal: <70  Educated on dietary modifications. Follow a low cholesterol, low saturated fat diet with less that 200mg of cholesterol a day.  Avoid fried foods and high saturated fats.  Increase dietary fiber.  Regular exercise can reduce LDL (bad cholesterol) and raise HDL (good cholesterol). Encourage physical activity 5 times per week for 30 minutes per day.       Orders:  -     ezetimibe (ZETIA) 10 mg tablet; Take 1 tablet (10 mg total) by mouth once daily.  Dispense: 30 tablet; Refill: 11  -     insulin lispro 100 unit/mL pen; USE as directed PER SLIDING scale (UP TO 50 UNITS PER DAY)  Dispense: 15 mL; Refill: 11    4. Primary hypertension  Overview:  On lisinopril 2.5 mg daily and metoprolol 12.5 mg b.i.d..      Assessment & Plan:  Well controlled.   Continue current regimen.   Low Sodium Diet (DASH Diet - Less than 2 grams of sodium per day).  Monitor blood pressure daily and log. Report consistent numbers  greater than 140/90.  Maintain healthy weight with goal BMI <30. Exercise 30 minutes per day, 5 days per week.  Smoking cessation encouraged to aid in BP reduction.        Orders:  -     lisinopriL (PRINIVIL,ZESTRIL) 2.5 MG tablet; Take 1 tablet (2.5 mg total) by mouth once daily.  Dispense: 30 tablet; Refill: 11    5. Primary osteoarthritis involving multiple joints  Overview:  NSAIDs as needed      6. Subclinical hypothyroidism  Overview:  Repeat TSH and add Free T4    Assessment & Plan:  Lab Results   Component Value Date    TSH 6.810 (H) 07/07/2025    [id number] 0.92 07/07/2025     Will continue to monitor        7. Arteriosclerosis of coronary artery  Overview:  On beta blocker, 81 mg of aspirin daily and a statin.    Assessment & Plan:  Stable; continue current therapy.     LDL goal less than 70, currently at goal       Orders:  -     BRILINTA 60 mg tablet; Take 1 tablet (60 mg total) by mouth 2 (two) times daily.  Dispense: 60 tablet; Refill: 11    8. S/P right coronary artery (RCA) stent placement  Overview:  On Brilinta    Assessment & Plan:  Stable; continue current therapy.         9. Recurrent major depressive disorder, in full remission  Overview:  On Paxil 30 mg daily.     Assessment & Plan:  Stable.  Continue current dose of Paxil.    Orders:  -     paroxetine (PAXIL) 30 MG tablet; Take 1 tablet (30 mg total) by mouth once daily.  Dispense: 30 tablet; Refill: 11    10. Tobacco user  Overview:  1ppd x 40+ years.     Assessment & Plan:  Tried Wellbutrin, Chantix and Nicoderm patches and failed smoking cessation.   Educated patient on need to identify triggers for cigarette smoking and to find an alternative to alleviate these triggers such as walking, eating unsalted sunflower seeds, eating carrots. Advised patient to develop a plan to quit smoking whether it is to decrease by a few cigarettes every 3-5 days or quit cold turkey and to schedule a quit day. Patient states understanding.             Assessment & Plan    E10.65 Uncontrolled type 1 diabetes mellitus with hyperglycemia  Z00.01 Encounter for well adult exam with abnormal findings  E78.5 Hyperlipidemia, unspecified hyperlipidemia type  I10 Hypertension, unspecified type  M15.0 Primary osteoarthritis involving multiple joints  E03.8 Subclinical hypothyroidism  I25.10 Arteriosclerosis of coronary artery  Z95.5 S/P right coronary artery (RCA) stent placement  F33.42 Recurrent major depressive disorder, in full remission  Z72.0 Tobacco user  F32.9 Major depressive disorder, remission status unspecified, unspecified whether recurrent    IMPRESSION:   Reviewed recent lab results, noting elevated glucose (370) and A1C (9.6).   Considered adjusting Lantus dosing strategy to address elevated A1C while avoiding nighttime hypoglycemia.   Evaluated effectiveness of current diabetes management plan, recognizing need for improved glucose control.   Assessed foot health and sensation as part of routine diabetic care.    E10.65 UNCONTROLLED TYPE 1 DIABETES MELLITUS WITH HYPERGLYCEMIA:   Increased morning Lantus dose from 10 units to 12 units for a few days, then to 14 units if tolerated, while maintaining evening dose at 10 units.   Ordered Dexcom G6 continuous glucose monitor  and sensors (4 per refill, 11 refills).   Follow up in approximately 1 month to reassess diabetes management.    Z00.01 ENCOUNTER FOR WELL ADULT EXAM WITH ABNORMAL FINDINGS:   Contact the office to discuss possibility of telehealth visit for follow-up, pending confirmation of logistics.    E78.5 HYPERLIPIDEMIA, UNSPECIFIED HYPERLIPIDEMIA TYPE:   Continued Lipitor, Ezetimibe.    I10 HYPERTENSION, UNSPECIFIED TYPE:   Continued Metoprolol 1/2 tablet twice daily.    F33.42 RECURRENT MAJOR DEPRESSIVE DISORDER, IN FULL REMISSION:   Continued Paroxetine.    F32.9 MAJOR DEPRESSIVE DISORDER, REMISSION STATUS UNSPECIFIED, UNSPECIFIED WHETHER RECURRENT:   Continued current management.          Future Appointments   Date Time Provider Department Center   8/11/2025 11:00 AM Lisandra Flores FNP-C [facility]   7/2/2026  8:20 AM LAB, [facility] LABORATORY DRAW STATION [facility]   7/9/2026 10:30 [facility]       Follow up for 1), 1 mo f/u, DM, televisit if needed 2), 1 year, Wellness, Fasting Labs prior. Call sooner if needed.    TRENA Blackman    Lab Frequency Next Occurrence   Ambulatory referral/consult to Smoking Cessation Program Once 06/21/2024            This note was generated with the assistance of ambient listening technology. Verbal consent was obtained by the patient and accompanying visitor(s) for the recording of patient appointment to facilitate this note. I attest to having reviewed and edited the generated note for accuracy, though some syntax or spelling errors may persist. Please contact the author of this note for any clarification.

## 2025-07-08 NOTE — ASSESSMENT & PLAN NOTE
Diabetes labs:   Lab Results   Component Value Date    HGBA1C 9.6 (H) 07/07/2025      Diabetic foot exam done today.   Continue current medications; advise that she increase her a.m. dose of Lantus (increase by 2 units every 2-3 days), can leave the nighttime dose at 10 units as she reports evening lows in her blood sugar.  I would like to follow-up with her and be able to see her Dexcom log which she does not have with her today so that we can figure out how to adjust insulin to attain better control.  Take all medications as directed; compliance is critical for managing your diabetes.   Follow American Diabetic Association (ADA) dietary guidelines for eating and implement exercise into your daily regimen.   Check your glucose levels each morning and record for review at follow up.    
Lab Results   Component Value Date    CHOL 94 07/07/2025    HDL 36 (L) 07/07/2025    LDLDIRECT <30.0 (L) 07/07/2025    TRIG 126 07/07/2025     Stable. Continue current therapy.  LDL Goal: <70  Educated on dietary modifications. Follow a low cholesterol, low saturated fat diet with less that 200mg of cholesterol a day.  Avoid fried foods and high saturated fats.  Increase dietary fiber.  Regular exercise can reduce LDL (bad cholesterol) and raise HDL (good cholesterol). Encourage physical activity 5 times per week for 30 minutes per day.     
Lab Results   Component Value Date    TSH 6.810 (H) 07/07/2025    [id number] 0.92 07/07/2025     Will continue to monitor    
Stable.  Continue current dose of Paxil.  
Stable; continue current therapy.     
Stable; continue current therapy.     LDL goal less than 70, currently at goal     
Tried Wellbutrin, Chantix and Nicoderm patches and failed smoking cessation.   Educated patient on need to identify triggers for cigarette smoking and to find an alternative to alleviate these triggers such as walking, eating unsalted sunflower seeds, eating carrots. Advised patient to develop a plan to quit smoking whether it is to decrease by a few cigarettes every 3-5 days or quit cold turkey and to schedule a quit day. Patient states understanding.   
Well controlled.   Continue current regimen.   Low Sodium Diet (DASH Diet - Less than 2 grams of sodium per day).  Monitor blood pressure daily and log. Report consistent numbers greater than 140/90.  Maintain healthy weight with goal BMI <30. Exercise 30 minutes per day, 5 days per week.  Smoking cessation encouraged to aid in BP reduction.      
moderate
DISPLAY PLAN FREE TEXT

## 2025-08-11 ENCOUNTER — OFFICE VISIT (OUTPATIENT)
Dept: FAMILY MEDICINE | Facility: CLINIC | Age: 60
End: 2025-08-11
Payer: MEDICAID

## 2025-08-11 VITALS
OXYGEN SATURATION: 95 % | BODY MASS INDEX: 23.15 KG/M2 | TEMPERATURE: 97 F | WEIGHT: 125.81 LBS | HEIGHT: 62 IN | HEART RATE: 75 BPM | DIASTOLIC BLOOD PRESSURE: 60 MMHG | SYSTOLIC BLOOD PRESSURE: 120 MMHG

## 2025-08-11 DIAGNOSIS — E10.65 UNCONTROLLED TYPE 1 DIABETES MELLITUS WITH HYPERGLYCEMIA: Primary | ICD-10-CM

## 2025-08-11 DIAGNOSIS — Z76.0 MEDICATION REFILL: ICD-10-CM

## 2025-08-11 DIAGNOSIS — Z91.199 NON COMPLIANCE WITH MEDICAL TREATMENT: ICD-10-CM

## 2025-08-11 DIAGNOSIS — F17.200 NEEDS SMOKING CESSATION EDUCATION: ICD-10-CM

## 2025-08-11 DIAGNOSIS — I10 PRIMARY HYPERTENSION: ICD-10-CM

## 2025-08-11 PROCEDURE — 3066F NEPHROPATHY DOC TX: CPT | Mod: CPTII,,, | Performed by: NURSE PRACTITIONER

## 2025-08-11 PROCEDURE — 3008F BODY MASS INDEX DOCD: CPT | Mod: CPTII,,, | Performed by: NURSE PRACTITIONER

## 2025-08-11 PROCEDURE — 3074F SYST BP LT 130 MM HG: CPT | Mod: CPTII,,, | Performed by: NURSE PRACTITIONER

## 2025-08-11 PROCEDURE — 4010F ACE/ARB THERAPY RXD/TAKEN: CPT | Mod: CPTII,,, | Performed by: NURSE PRACTITIONER

## 2025-08-11 PROCEDURE — 3046F HEMOGLOBIN A1C LEVEL >9.0%: CPT | Mod: CPTII,,, | Performed by: NURSE PRACTITIONER

## 2025-08-11 PROCEDURE — 3078F DIAST BP <80 MM HG: CPT | Mod: CPTII,,, | Performed by: NURSE PRACTITIONER

## 2025-08-11 PROCEDURE — 1160F RVW MEDS BY RX/DR IN RCRD: CPT | Mod: CPTII,,, | Performed by: NURSE PRACTITIONER

## 2025-08-11 PROCEDURE — 3061F NEG MICROALBUMINURIA REV: CPT | Mod: CPTII,,, | Performed by: NURSE PRACTITIONER

## 2025-08-11 PROCEDURE — 99214 OFFICE O/P EST MOD 30 MIN: CPT | Mod: ,,, | Performed by: NURSE PRACTITIONER

## 2025-08-11 PROCEDURE — 1159F MED LIST DOCD IN RCRD: CPT | Mod: CPTII,,, | Performed by: NURSE PRACTITIONER

## 2025-08-11 RX ORDER — DICLOFENAC SODIUM 10 MG/G
2 GEL TOPICAL DAILY
Qty: 100 G | Refills: 11 | Status: SHIPPED | OUTPATIENT
Start: 2025-08-11

## 2025-08-11 RX ORDER — FLUTICASONE PROPIONATE 50 MCG
2 SPRAY, SUSPENSION (ML) NASAL 2 TIMES DAILY
Qty: 16 G | Refills: 5 | Status: SHIPPED | OUTPATIENT
Start: 2025-08-11

## 2025-08-11 RX ORDER — METOPROLOL TARTRATE 25 MG/1
12.5 TABLET, FILM COATED ORAL 2 TIMES DAILY
Qty: 30 TABLET | Refills: 11 | Status: SHIPPED | OUTPATIENT
Start: 2025-08-11

## (undated) DEVICE — INTRODUCER TRNSRADIAL 6F 10CM

## (undated) DEVICE — CATH OPTITORQUE RADIAL 5FR

## (undated) DEVICE — CATH 5FR AR1.0 5/BX

## (undated) DEVICE — CATH EMPULSE ANGLED 5FR PIGTAI

## (undated) DEVICE — CATH PERFORMA 038IN 5FR 100CM

## (undated) DEVICE — BAND TR COMP DEVICE REG 24CM

## (undated) DEVICE — CATH IMPULSE 5F 100CM FR4

## (undated) DEVICE — GUIDEWIRE EMERALD .035IN 260CM

## (undated) DEVICE — DRAPE ANGIO BRACH 38X44IN

## (undated) DEVICE — CATH AL 1.0 5/BX

## (undated) DEVICE — TUBING HP AIRLSS ROT ADPT 30IN